# Patient Record
Sex: FEMALE | Race: WHITE | NOT HISPANIC OR LATINO | Employment: UNEMPLOYED | URBAN - METROPOLITAN AREA
[De-identification: names, ages, dates, MRNs, and addresses within clinical notes are randomized per-mention and may not be internally consistent; named-entity substitution may affect disease eponyms.]

---

## 2017-01-11 ENCOUNTER — HISTORICAL (OUTPATIENT)
Dept: RADIOLOGY | Facility: HOSPITAL | Age: 52
End: 2017-01-11

## 2017-02-10 ENCOUNTER — HISTORICAL (OUTPATIENT)
Dept: RADIOLOGY | Facility: HOSPITAL | Age: 52
End: 2017-02-10

## 2017-04-12 ENCOUNTER — HISTORICAL (OUTPATIENT)
Dept: RADIOLOGY | Facility: HOSPITAL | Age: 52
End: 2017-04-12

## 2017-05-08 ENCOUNTER — HISTORICAL (OUTPATIENT)
Dept: RADIOLOGY | Facility: HOSPITAL | Age: 52
End: 2017-05-08

## 2017-05-22 ENCOUNTER — HISTORICAL (OUTPATIENT)
Dept: ADMINISTRATIVE | Facility: HOSPITAL | Age: 52
End: 2017-05-22

## 2017-06-19 ENCOUNTER — HISTORICAL (OUTPATIENT)
Dept: ADMINISTRATIVE | Facility: HOSPITAL | Age: 52
End: 2017-06-19

## 2017-07-10 ENCOUNTER — HISTORICAL (OUTPATIENT)
Dept: SURGERY | Facility: HOSPITAL | Age: 52
End: 2017-07-10

## 2017-07-10 LAB
ABS NEUT (OLG): 4.4 X10(3)/MCL (ref 1.5–6.9)
ALBUMIN SERPL-MCNC: 3.3 GM/DL (ref 3.4–5)
ALBUMIN/GLOB SERPL: 0.8 RATIO
ALP SERPL-CCNC: 88 UNIT/L (ref 30–113)
ALT SERPL-CCNC: 20 UNIT/L (ref 10–45)
APTT PPP: 24.5 SECOND(S) (ref 25–35)
AST SERPL-CCNC: 12 UNIT/L (ref 15–37)
BILIRUB SERPL-MCNC: 0.6 MG/DL (ref 0.1–0.9)
BILIRUBIN DIRECT+TOT PNL SERPL-MCNC: 0.3 MG/DL
BILIRUBIN DIRECT+TOT PNL SERPL-MCNC: 0.3 MG/DL (ref 0–0.3)
BUN SERPL-MCNC: 12 MG/DL (ref 10–20)
CALCIUM SERPL-MCNC: 9.4 MG/DL (ref 8–10.5)
CHLORIDE SERPL-SCNC: 102 MMOL/L (ref 100–108)
CO2 SERPL-SCNC: 28 MMOL/L (ref 21–35)
CREAT SERPL-MCNC: 0.85 MG/DL (ref 0.7–1.3)
ERYTHROCYTE [DISTWIDTH] IN BLOOD BY AUTOMATED COUNT: 13.5 % (ref 11.5–17)
GLOBULIN SER-MCNC: 3.9 GM/DL
GLUCOSE SERPL-MCNC: 117 MG/DL (ref 75–116)
HCT VFR BLD AUTO: 39.7 % (ref 36–48)
HGB BLD-MCNC: 13.4 GM/DL (ref 12–16)
INR PPP: 1 (ref 0–1.2)
MCH RBC QN AUTO: 30 PG (ref 27–34)
MCHC RBC AUTO-ENTMCNC: 34 GM/DL (ref 31–36)
MCV RBC AUTO: 87 FL (ref 80–99)
PLATELET # BLD AUTO: 261 X10(3)/MCL (ref 140–400)
PMV BLD AUTO: 11.1 FL (ref 6.8–10)
POTASSIUM SERPL-SCNC: 4.6 MMOL/L (ref 3.6–5.2)
PROT SERPL-MCNC: 7.2 GM/DL (ref 6.4–8.2)
PROTHROMBIN TIME: 10.3 SECOND(S) (ref 9–12)
RBC # BLD AUTO: 4.55 X10(6)/MCL (ref 4.2–5.4)
SODIUM SERPL-SCNC: 139 MMOL/L (ref 135–145)
WBC # SPEC AUTO: 6.9 X10(3)/MCL (ref 4.5–11.5)

## 2017-07-13 ENCOUNTER — HISTORICAL (OUTPATIENT)
Dept: ANESTHESIOLOGY | Facility: HOSPITAL | Age: 52
End: 2017-07-13

## 2017-07-26 ENCOUNTER — HISTORICAL (OUTPATIENT)
Dept: ADMINISTRATIVE | Facility: HOSPITAL | Age: 52
End: 2017-07-26

## 2017-08-02 ENCOUNTER — HISTORICAL (OUTPATIENT)
Dept: ADMINISTRATIVE | Facility: HOSPITAL | Age: 52
End: 2017-08-02

## 2017-08-02 LAB
ALBUMIN SERPL-MCNC: 3.4 GM/DL (ref 3.4–5)
ALBUMIN/GLOB SERPL: 1 RATIO (ref 1–2)
ALP SERPL-CCNC: 68 UNIT/L (ref 45–117)
ALT SERPL-CCNC: 22 UNIT/L (ref 12–78)
AST SERPL-CCNC: 21 UNIT/L (ref 15–37)
BILIRUB SERPL-MCNC: 0.4 MG/DL (ref 0.2–1)
BILIRUBIN DIRECT+TOT PNL SERPL-MCNC: 0.1 MG/DL
BILIRUBIN DIRECT+TOT PNL SERPL-MCNC: 0.3 MG/DL
BUN SERPL-MCNC: 13 MG/DL (ref 7–18)
CALCIUM SERPL-MCNC: 9.4 MG/DL (ref 8.5–10.1)
CHLORIDE SERPL-SCNC: 102 MMOL/L (ref 98–107)
CO2 SERPL-SCNC: 31 MMOL/L (ref 21–32)
CREAT SERPL-MCNC: 0.8 MG/DL (ref 0.6–1.3)
ERYTHROCYTE [DISTWIDTH] IN BLOOD BY AUTOMATED COUNT: 13 % (ref 11.5–14.5)
EST. AVERAGE GLUCOSE BLD GHB EST-MCNC: 160 MG/DL
GLOBULIN SER-MCNC: 3.5 GM/ML (ref 2.3–3.5)
GLUCOSE SERPL-MCNC: 170 MG/DL (ref 74–106)
HBA1C MFR BLD: 7.2 % (ref 4.2–6.3)
HCT VFR BLD AUTO: 37.3 % (ref 35–46)
HGB BLD-MCNC: 12.7 GM/DL (ref 12–16)
MCH RBC QN AUTO: 29.2 PG (ref 26–34)
MCHC RBC AUTO-ENTMCNC: 34 GM/DL (ref 31–37)
MCV RBC AUTO: 85.7 FL (ref 80–100)
PLATELET # BLD AUTO: 230 X10(3)/MCL (ref 130–400)
PMV BLD AUTO: 10.9 FL (ref 7.4–10.4)
POTASSIUM SERPL-SCNC: 3.8 MMOL/L (ref 3.5–5.1)
PROT SERPL-MCNC: 6.9 GM/DL (ref 6.4–8.2)
RBC # BLD AUTO: 4.35 X10(6)/MCL (ref 4–5.2)
SODIUM SERPL-SCNC: 141 MMOL/L (ref 136–145)
WBC # SPEC AUTO: 5.9 X10(3)/MCL (ref 4.5–11)

## 2017-08-08 ENCOUNTER — HISTORICAL (OUTPATIENT)
Dept: SURGERY | Facility: HOSPITAL | Age: 52
End: 2017-08-08

## 2017-08-08 LAB — POTASSIUM SERPL-SCNC: 4 MMOL/L (ref 3.5–5.1)

## 2017-08-25 ENCOUNTER — HISTORICAL (OUTPATIENT)
Dept: ADMINISTRATIVE | Facility: HOSPITAL | Age: 52
End: 2017-08-25

## 2017-09-25 ENCOUNTER — HISTORICAL (OUTPATIENT)
Dept: ADMINISTRATIVE | Facility: HOSPITAL | Age: 52
End: 2017-09-25

## 2017-09-27 ENCOUNTER — HISTORICAL (OUTPATIENT)
Dept: ADMINISTRATIVE | Facility: HOSPITAL | Age: 52
End: 2017-09-27

## 2017-11-27 ENCOUNTER — HISTORICAL (OUTPATIENT)
Dept: ADMINISTRATIVE | Facility: HOSPITAL | Age: 52
End: 2017-11-27

## 2017-11-30 ENCOUNTER — HISTORICAL (OUTPATIENT)
Dept: RADIOLOGY | Facility: HOSPITAL | Age: 52
End: 2017-11-30

## 2018-03-09 ENCOUNTER — HISTORICAL (OUTPATIENT)
Dept: RADIOLOGY | Facility: HOSPITAL | Age: 53
End: 2018-03-09

## 2018-03-19 ENCOUNTER — HISTORICAL (OUTPATIENT)
Dept: RADIOLOGY | Facility: HOSPITAL | Age: 53
End: 2018-03-19

## 2018-07-02 ENCOUNTER — HISTORICAL (OUTPATIENT)
Dept: ADMINISTRATIVE | Facility: HOSPITAL | Age: 53
End: 2018-07-02

## 2018-08-24 ENCOUNTER — HISTORICAL (OUTPATIENT)
Dept: SURGERY | Facility: HOSPITAL | Age: 53
End: 2018-08-24

## 2018-08-24 LAB
ABS NEUT (OLG): 2.9 X10(3)/MCL (ref 1.5–6.9)
ALBUMIN SERPL-MCNC: 3.1 GM/DL (ref 3.4–5)
ALBUMIN/GLOB SERPL: 0.9 RATIO
ALP SERPL-CCNC: 73 UNIT/L (ref 30–113)
ALT SERPL-CCNC: 17 UNIT/L (ref 10–45)
APTT PPP: 24.2 SECOND(S) (ref 25–35)
AST SERPL-CCNC: 12 UNIT/L (ref 15–37)
BASOPHILS # BLD AUTO: 0 X10(3)/MCL (ref 0–0.1)
BASOPHILS NFR BLD AUTO: 0 % (ref 0–1)
BILIRUB SERPL-MCNC: 0.6 MG/DL (ref 0.1–0.9)
BILIRUBIN DIRECT+TOT PNL SERPL-MCNC: 0.2 MG/DL (ref 0–0.3)
BILIRUBIN DIRECT+TOT PNL SERPL-MCNC: 0.4 MG/DL
BUN SERPL-MCNC: 12 MG/DL (ref 10–20)
CALCIUM SERPL-MCNC: 9.2 MG/DL (ref 8–10.5)
CHLORIDE SERPL-SCNC: 106 MMOL/L (ref 100–108)
CO2 SERPL-SCNC: 33 MMOL/L (ref 21–35)
CREAT SERPL-MCNC: 0.88 MG/DL (ref 0.7–1.3)
EOSINOPHIL # BLD AUTO: 0 X10(3)/MCL (ref 0–0.6)
EOSINOPHIL NFR BLD AUTO: 0 % (ref 0–5)
ERYTHROCYTE [DISTWIDTH] IN BLOOD BY AUTOMATED COUNT: 13.6 % (ref 11.5–17)
GLOBULIN SER-MCNC: 3.6 GM/DL
GLUCOSE SERPL-MCNC: 94 MG/DL (ref 75–116)
HCT VFR BLD AUTO: 37.1 % (ref 36–48)
HGB BLD-MCNC: 12.1 GM/DL (ref 12–16)
INR PPP: 0.9 (ref 0–1.2)
LYMPHOCYTES # BLD AUTO: 2.1 X10(3)/MCL (ref 0.5–4.1)
LYMPHOCYTES NFR BLD AUTO: 38.8 % (ref 15–40)
MCH RBC QN AUTO: 28 PG (ref 27–34)
MCHC RBC AUTO-ENTMCNC: 33 GM/DL (ref 31–36)
MCV RBC AUTO: 87 FL (ref 80–99)
MONOCYTES # BLD AUTO: 0.4 X10(3)/MCL (ref 0–1.1)
MONOCYTES NFR BLD AUTO: 7 % (ref 4–12)
NEUTROPHILS # BLD AUTO: 2.9 X10(3)/MCL (ref 1.5–6.9)
NEUTROPHILS NFR BLD AUTO: 54 % (ref 43–75)
PLATELET # BLD AUTO: 230 X10(3)/MCL (ref 140–400)
PMV BLD AUTO: 10.8 FL (ref 6.8–10)
POTASSIUM SERPL-SCNC: 4.3 MMOL/L (ref 3.6–5.2)
PROT SERPL-MCNC: 6.7 GM/DL (ref 6.4–8.2)
PROTHROMBIN TIME: 9.9 SECOND(S) (ref 9–12)
RBC # BLD AUTO: 4.26 X10(6)/MCL (ref 4.2–5.4)
SODIUM SERPL-SCNC: 140 MMOL/L (ref 135–145)
WBC # SPEC AUTO: 5.4 X10(3)/MCL (ref 4.5–11.5)

## 2018-08-27 ENCOUNTER — HISTORICAL (OUTPATIENT)
Dept: ANESTHESIOLOGY | Facility: HOSPITAL | Age: 53
End: 2018-08-27

## 2019-01-16 ENCOUNTER — HISTORICAL (OUTPATIENT)
Dept: ADMINISTRATIVE | Facility: HOSPITAL | Age: 54
End: 2019-01-16

## 2019-04-24 ENCOUNTER — HISTORICAL (OUTPATIENT)
Dept: RADIOLOGY | Facility: HOSPITAL | Age: 54
End: 2019-04-24

## 2019-07-24 ENCOUNTER — HISTORICAL (OUTPATIENT)
Dept: RADIOLOGY | Facility: HOSPITAL | Age: 54
End: 2019-07-24

## 2019-07-30 ENCOUNTER — HISTORICAL (OUTPATIENT)
Dept: RADIOLOGY | Facility: HOSPITAL | Age: 54
End: 2019-07-30

## 2019-11-14 ENCOUNTER — HISTORICAL (OUTPATIENT)
Dept: ADMINISTRATIVE | Facility: HOSPITAL | Age: 54
End: 2019-11-14

## 2020-01-13 ENCOUNTER — HISTORICAL (OUTPATIENT)
Dept: RADIOLOGY | Facility: HOSPITAL | Age: 55
End: 2020-01-13

## 2020-02-18 ENCOUNTER — HISTORICAL (OUTPATIENT)
Dept: ADMINISTRATIVE | Facility: HOSPITAL | Age: 55
End: 2020-02-18

## 2020-09-10 ENCOUNTER — HISTORICAL (OUTPATIENT)
Dept: ADMINISTRATIVE | Facility: HOSPITAL | Age: 55
End: 2020-09-10

## 2020-09-11 ENCOUNTER — HISTORICAL (OUTPATIENT)
Dept: RADIOLOGY | Facility: HOSPITAL | Age: 55
End: 2020-09-11

## 2020-09-23 ENCOUNTER — HISTORICAL (OUTPATIENT)
Dept: RADIOLOGY | Facility: HOSPITAL | Age: 55
End: 2020-09-23

## 2020-11-05 ENCOUNTER — HISTORICAL (OUTPATIENT)
Dept: RADIOLOGY | Facility: HOSPITAL | Age: 55
End: 2020-11-05

## 2020-11-19 ENCOUNTER — HISTORICAL (OUTPATIENT)
Dept: RADIOLOGY | Facility: HOSPITAL | Age: 55
End: 2020-11-19

## 2021-01-05 ENCOUNTER — HISTORICAL (OUTPATIENT)
Dept: RADIOLOGY | Facility: HOSPITAL | Age: 56
End: 2021-01-05

## 2021-01-21 ENCOUNTER — HISTORICAL (OUTPATIENT)
Dept: ADMINISTRATIVE | Facility: HOSPITAL | Age: 56
End: 2021-01-21

## 2021-02-12 ENCOUNTER — HISTORICAL (OUTPATIENT)
Dept: RADIOLOGY | Facility: HOSPITAL | Age: 56
End: 2021-02-12

## 2021-06-29 ENCOUNTER — HISTORICAL (OUTPATIENT)
Dept: ADMINISTRATIVE | Facility: HOSPITAL | Age: 56
End: 2021-06-29

## 2021-06-29 LAB — CANCER AG125 SERPL-ACNC: 8.75 U/ML (ref 0–38.1)

## 2021-08-11 ENCOUNTER — HISTORICAL (OUTPATIENT)
Dept: RADIOLOGY | Facility: HOSPITAL | Age: 56
End: 2021-08-11

## 2021-09-03 ENCOUNTER — HISTORICAL (OUTPATIENT)
Dept: RADIOLOGY | Facility: HOSPITAL | Age: 56
End: 2021-09-03

## 2021-09-20 ENCOUNTER — HISTORICAL (OUTPATIENT)
Dept: ADMINISTRATIVE | Facility: HOSPITAL | Age: 56
End: 2021-09-20

## 2022-02-02 LAB — CANCER AG125 SERPL-ACNC: 9.32 (ref 0–38.1)

## 2022-02-22 ENCOUNTER — HISTORICAL (OUTPATIENT)
Dept: ADMINISTRATIVE | Facility: HOSPITAL | Age: 57
End: 2022-02-22

## 2022-04-10 ENCOUNTER — HISTORICAL (OUTPATIENT)
Dept: ADMINISTRATIVE | Facility: HOSPITAL | Age: 57
End: 2022-04-10

## 2022-04-26 VITALS
BODY MASS INDEX: 55.32 KG/M2 | DIASTOLIC BLOOD PRESSURE: 62 MMHG | HEIGHT: 61 IN | SYSTOLIC BLOOD PRESSURE: 124 MMHG | WEIGHT: 293 LBS

## 2022-04-30 NOTE — OP NOTE
PROCEDURE:  Colonoscopy.    PREOPERATIVE DIAGNOSIS:  History of colon polyps.    POSTOPERATIVE DIAGNOSIS:  Normal colon.  No signs of recurrence.    INDICATIONS:  This is a 52-year-old white female patient who had a colonoscopy performed by Dr. Amos Sandoval, at which point she had some colon polyps.  I did not have any access to her previous documentation due to they were archived.  She was consented for the procedure in my office to follow up on these polyps.  The risks and benefits of the procedure were explained to her in detail.  She is willing to undergo those risks.    PROCEDURE IN DETAIL:  The patient was brought down to the endoscopy room suite, laid in left lateral decubitus position.  Rectal exam was performed.  It was within normal limits.  No internal, no external abnormalities.  Good rectal tone.     The Olympus colonoscope was advanced all the way to the cecum.  Prep was suboptimal.  Otherwise, there were no masses, no polyps, no mucosal changes to the cecum.  The same applies to the ascending colon, hepatic flexure, transverse colon, splenic flexure, descending colon, and sigmoid.  There was no diverticular disease as well.  In the retroflexion of the rectum, there were no abnormalities as well.    SUMMARY:  A 7o-year-old white female with previous history of colonic polyps with no signs of recurrence.      RECOMMENDATIONS:  At this point, repeat colonoscopy in 10 years or sooner if clinically indicated.        PARMJIT/JULIA   DD: 07/13/2017 1204   DT: 07/13/2017 1231  Job # [id number]    cc: PACO Frances III, M.D.

## 2022-04-30 NOTE — PROGRESS NOTES
"   Patient:   Natalia Gomes             MRN: [id number]            FIN: [id number]               Age:   52 years     Sex:  Female     :  1965   Associated Diagnoses:   Primary osteoarthritis of both knees; Obesity   Author:   Wilder Armando NP      Chief Complaint   2017 12:39 CDT      Here with Bilat. knee pain. Here for Knee injections        History of Present Illness   51 yo  female presents to ortho clinic for c/o bilateral knee pain.  Reports that she has chronic pain to bilateral knees due to OA that started "a few years ago" and has been getting progressively worse.  Denies any known injury or trauma.  Reports that standing or walking for long periods of time makes the pain worse.  Reports that she is currently healing from a foot fracture that she had surgery on.  Admits receiving knee injections in the past with good relief and requests repeat injections today.  Denies any numbness/tingling to bilateral LE.  Admits taking prescribed medication as needed for pain with moderate relief.  No other complaints today.      Review of Systems   ROS reviewed as documented in chart      Health Status   Allergies:    Allergic Reactions (Selected)  No Known Allergies,    Allergies (1) Active Reaction  No Known Allergies None Documented     Current medications:  (Selected)   Outpatient Medications  Ordered  Kenalog-40 injectable suspension: 40 mg, form: Injection, Intra-Articular, Once, first dose 17 13:00:00 CDT, stop date 17 13:00:00 CDT, 24  Kenalog-40 injectable suspension: 40 mg, form: Injection, Intra-Articular, Once, first dose 17 14:00:00 CDT, stop date 17 14:00:00 CDT, 24  Lidocaine 1% 10ml inj: 5 mL, form: Soln, Intra-Articular, Once, first dose 17 13:00:00 CDT, stop date 17 13:00:00 CDT  Lidocaine 1% 10ml inj: 5 mL, form: Soln, Intra-Articular, Once, first dose 17 13:01:00 CDT, stop date 17 13:01:00 CDT  Versed: 2 mg, form: " Injection, IV Push, Once, first dose 08/08/17 11:00:00 CDT, stop date 08/08/17 11:00:00 CDT, 1,023,851  Documented Medications  Documented  Actos 45 mg oral tablet: 45 mg = 1 tab(s), Oral, Daily, # 30 tab(s), 0 Refill(s)  Crestor 20 mg oral tablet: 20 mg = 1 tab(s), Oral, At Bedtime, # 30 tab(s), 0 Refill(s)  DULOXETINE 60MG CAP: 60 mg = 1 cap(s), Oral, Daily  Gemfibrozil 600 Mg Tablet: 600 mg = 1 tab(s), Oral, BID  Lyrica 75 mg oral capsule: 75 mg = 1 cap(s), Oral, TID, 0 Refill(s)  Polyeth Glyc 3350 Nf Pow:   Premarin 0.45 mg oral tablet: 0.45 mg = 1 tab(s), Oral, Daily, # 30 tab(s), 0 Refill(s)  Tramadol Hcl 50mg Tab: 50 mg = 1 tab(s), Oral, TID  buPROPion 150 mg/24 hours (XL) oral tablet, extended release: 300 mg =, Oral, q24hr, # 30 tab(s), 0 Refill(s)  glimepiride: 4 mg, Oral, BID, 0 Refill(s)  hydrochlorothiazide-valsartan 12.5 mg-80 mg oral tablet: 1 tab(s), Oral, Daily, # 30 tab(s), 0 Refill(s)  lamoTRIgine 100 mg oral tablet: 100 mg = 1 tab(s), Oral, BID, # 180 tab(s), 0 Refill(s)  metformin 500 mg oral tablet: 500 mg = 1 tab(s), Oral, BID, # 60 tab(s), 0 Refill(s)  omeprazole 40 mg oral DR capsule: 40 mg = 1 cap(s), Oral, Daily, # 30 cap(s), 0 Refill(s),    Home Medications (14) Active  Actos 45 mg oral tablet 45 mg = 1 tab(s), Oral, Daily  buPROPion 150 mg/24 hours (XL) oral tablet, extended release 300 mg, Oral, q24hr  Crestor 20 mg oral tablet 20 mg = 1 tab(s), Oral, At Bedtime  DULOXETINE 60MG CAP 60 mg = 1 cap(s), Oral, Daily  Gemfibrozil 600 Mg Tablet 600 mg = 1 tab(s), Oral, BID  glimepiride 4 mg, Oral, BID  hydrochlorothiazide-valsartan 12.5 mg-80 mg oral tablet 1 tab(s), Oral, Daily  lamoTRIgine 100 mg oral tablet 100 mg = 1 tab(s), Oral, BID  Lyrica 75 mg oral capsule 75 mg = 1 cap(s), Oral, TID  metformin 500 mg oral tablet 500 mg = 1 tab(s), Oral, BID  omeprazole 40 mg oral DR capsule 40 mg = 1 cap(s), Oral, Daily  Polyeth Glyc 3350 Nf Pow   Premarin 0.45 mg oral tablet 0.45 mg = 1 tab(s),  Oral, Daily  Tramadol Hcl 50mg Tab 50 mg = 1 tab(s), Oral, TID  ,    Medications (4) Active  Scheduled: (4)  lidocaine 1% MDV- 10 mL (per 10mg)  5 mL, Intra-Articular, Once  lidocaine 1% MDV- 10 mL (per 10mg)  5 mL, Intra-Articular, Once  triamcinolone acetonide 40 mg/ml Inj  40 mg 1 mL, Intra-Articular, Once  triamcinolone acetonide 40 mg/ml Inj  40 mg 1 mL, Intra-Articular, Once  Continuous: (0)  PRN: (0)     Problem list:    All Problems  Arthritis / SNOMED CT [id number] / Confirmed  Depression / SNOMED CT 578212580 / Confirmed  Diabetes / SNOMED CT [id number] / Confirmed  GERD (gastroesophageal reflux disease) / SNOMED CT [id number] / Confirmed  HTN (hypertension) / SNOMED CT [id number] / Confirmed  Hyperlipidaemia / SNOMED CT 425299539 / Confirmed  Metatarsal bone fracture / SNOMED CT 869854847 / Confirmed,    Active Problems (7)  Arthritis   Depression   Diabetes   GERD (gastroesophageal reflux disease)   HTN (hypertension)   Hyperlipidaemia   Metatarsal bone fracture         Histories   Past Medical History:    Active  Diabetes ([id number])  HTN (hypertension) ([id number])  Hyperlipidaemia (275784191)  Depression (405747530)  GERD (gastroesophageal reflux disease) (14XMO0Y9-37N2-0567-XZ6M-[id number])   Family History:    Metastatic cancer  Brother  Acute myocardial infarction.  Father     Procedure history:    ORIF Metatarsal (Right) on 8/8/2017 at 52 Years.  Comments:  8/10/2017 10:54 - Sofya AGUDELO , Yumiko MARCUS  auto-populated from documented surgical case  Bone Graft (None) on 8/8/2017 at 52 Years.  Comments:  8/10/2017 10:54 - Sofya AGUDELO , Yumiko MARCUS  auto-populated from documented surgical case  Colonoscopy (None) on 7/13/2017 at 52 Years.  Comments:  7/13/2017 12:05 - Shelbie AGUDELO, Mirna  auto-populated from documented surgical case  Cholecystectomy Laparoscopic  (None) on 7/6/2016 at 51 Years.  Comments:  7/6/2016 08:27 - Jannet Torres RN  auto-populated from documented surgical case  Hemorrhoidectomy (90437723).  Bladder neck operations (096046204).  Hysterectomy (317666908).  Colonoscopy, flexible; diagnostic, including collection of specimen(s) by brushing or washing, when performed (separate procedure) (94719).   Social History        Social & Psychosocial Habits    Alcohol  07/05/2016  Use: Never    Employment/School  07/10/2017  Status: Unemployed    Home/Environment  07/10/2017  Lives with: Children    Living situation: Home with assistance    Substance Abuse  07/05/2016  Use: Never    Tobacco  06/14/2016  Use: Former smoker    Number of years: 20  .        Physical Examination   Measurements from flowsheet : Measurements   9/27/2017 12:39 CDT      Weight Dosing             127.2 kg                             Weight Measured           127.2 kg                             Weight Measured and Calculated in Lbs     280.43 lb                             Height/Length Dosing      154.94 cm                             Height/Length Measured    154.94 cm                             BSA Measured              2.34 m2                             Body Mass Index Measured  52.99 kg/m2     General:  Alert and oriented.    HENT:  Normocephalic.    Neck:  Supple.    Integumentary:  Warm, Dry, Pink.    Neurologic:  No focal deficits.    Cognition and Speech:  Speech clear and coherent.    Psychiatric:  Appropriate mood & affect.    Bilateral knee exam:  Good ROM with pain/crepitis associated with movement.  TTP to medial and lateral joint lines.  NV intact.  No effusion noted.  +2 pedal pulse.  Extension = 5.  Flexion = 120.  Quad deconditioniong R > L.         Health Maintenance      Health Maintenance     Pending (in the next year)        Due            Alcohol Misuse Screening due  09/27/17  and every 1  year(s)           Cervical Cancer Screening due  09/27/17  and every 5   year(s)           Lipid Screening due  09/27/17  Variable frequency           Tetanus Vaccine due  09/27/17  and every 10  year(s)        Due In Future            Influenza Vaccine not due until  10/02/17  and every 1  year(s)           Blood Pressure Screening not due until  08/08/18  and every 1  year(s)           Depression Screening not due until  08/25/18  and every 1  year(s)     Satisfied (in the past 1 year)        Satisfied            Blood Pressure Screening on  08/08/17.  Satisfied by Tadeo Landeros PT           Body Mass Index Check on  09/27/17.  Satisfied by Jeane Gustafson LPN           Breast Cancer Screening on  02/10/17.  Satisfied by Madison Saeed           Colorectal Screening on  07/13/17.           Depression Screening on  08/25/17.  Satisfied by Morales Yu           Diabetes Screening on  08/02/17.  Satisfied by Sarah Blake           Obesity Screening on  09/27/17.  Satisfied by Jeane Gustafson LPN          Procedure   Joint aspiration/ injection procedure   Date/ Time:  9/27/2017 13:27:00.     Confirmed: patient, procedure, side, site, safety procedures followed.     Performed by: self.     Informed consent: signed by patient.     Indication: symptomatic relief.     Location: the left knee, the right knee.     Preparation and technique: skin prep alcohol/iodophor (Dura Prep), sterile preparation of site in usual fashion.     Joint injected: with  Lidocaine 1% 5mL and Kenalog 40mg 1mL.     Procedure tolerated: well.        Review / Management   Results review:     No qualifying data available.      Bilateral knee x-rays (9/27/17):    Impression:  Degenerative changes with no acute abnormalities noted.      Impression and Plan   Diagnosis     Primary osteoarthritis of both knees (DYW11-US M17.0).     Obesity (OVP06-LW E66.9).       Plan:  1.  Bilateral knee injections.  2.  ROM/strengthening exercises to bilateral LE (printed instructions given).  3.  Continue prescribed  medication as needed for pain relief.  4.  Ice compresses and activity modifications as needed for symptom relief.  5.  Diet modifications and low impact exercises to help with weight loss.  6.  RTC PRN.      Patient Instructions:  Knee Injection, Care After, Obesity, Easy-to-Read, Osteoarthritis.

## 2022-04-30 NOTE — OP NOTE
PREOPERATIVE DIAGNOSIS:  Large lipoma of the upper back.    POSTOPERATIVE DIAGNOSIS:  Large lipoma of the upper back.    OPERATION PERFORMED:  Excision of large lipoma of the upper back and layered closure of the wound.    ANESTHESIA:  General.    PROCEDURE IN DETAIL:  This 53-year-old female patient was brought to the operating room, placed in supine position.  General endotracheal anesthesia was given.  Subsequently was placed in prone position.  The neck was kept in flexion.  The upper back was prepared and draped in the usual fashion.  This mass was just inferior to the neck.  By a long transverse incision, skin and subcutaneous tissue were incised.  Flaps were raised to either side.  The large lipoma was identified.  It measured 15 x 8 cm x 5.5 cm in size.  During the excision, I even ended up removing some deep fascia.  Hemostasis was obtained with diathermy.  The deep fascia reapproximated with 0 Vicryl interrupted sutures.  Part of the subcutaneous plane approximated with 0 Vicryl interrupted sutures.  A Steven-Dominguez drain was introduced in the depth of the wound and brought out through a separate stab wound on the right side of the upper back.  Skin approximated with skin clips.  Drain fixed to the skin with 2-0 silk suture.  Dressing applied.  She tolerated the procedure well.  She received 1 g Ancef prior to the procedure.  Prior to layered closure, wound measured 14 cm.  She was transferred to recovery room in good condition.        AMY/JULIA   DD: 08/27/2018 2133   DT: 08/27/2018 2237  Job # [id number]    cc: PACO Perez M.D.

## 2022-04-30 NOTE — H&P
Patient:   Natalia Gomes             MRN: [id number]            FIN: [id number]               Age:   52 years     Sex:  Female     :  1965   Associated Diagnoses:   None   Author:   Angelina CALLEJAS, Prosper ROSADO have reviewed the H&P below, there are no changes.    Chief Complaint F/U Rt Foot Fx   History of Present Illness 52F with hx of R caroline fx sustained 2017 that was treated non operatively in cast and then hard sole shoe. She was last seen last month and made PRN as the fx had shown signs of healing. She returns today for re-eval due to continued pain and swelling   Review of Systems Constitutional no fevers, chills  Eye neg  ENMT neg  Respiratory no shortness of breath  Cardiovascular no chest pain  Gastrointestinal no abdominal pain  Genitourinary neg  Hema/Lymph no easy bruising  Endocrine neg  Immunologic neg  Musculoskeletal as above  Integumentary as above  Neurologic as above  All Other ROS negative except as stated above   Problem List/Past Medical History Arthritis Depression Diabetes GERD (gastroesophageal reflux disease) HTN (hypertension) Hyperlipidaemia Historical   No historical problems Procedure/Surgical History Colonoscopy (None) (2017), COLORECTAL CANCER SCREENING; COLONOSCOPY ON INDIVIDUAL AT HIGH RISK (2017), Inspection of Lower Intestinal Tract, Via Natural or Artificial Opening Endoscopic (2017), Cholecystectomy Laparoscopic (None) (2016), Laparoscopy, surgical; cholecystectomy (2016), Resection of Gallbladder, Percutaneous Endoscopic Approach (2016), Bladder neck operations, Colonoscopy, flexible; diagnostic, including collection of specimen(s) by brushing or washing, when performed (separate procedure), Hemorrhoidectomy, Hysterectomy. Medications Actos 45 mg oral tablet, 45 mg, 1 tab(s), Oral, Daily buPROPion 150 mg/24 hours (XL) oral tablet, extended release, 300 mg, Oral, q24hr Crestor 20 mg oral tablet, 20 mg, 1 tab(s), Oral,  At Bedtime DULOXETINE 60MG CAP, 60 mg, 1 cap(s), Oral, Daily glimepiride, 4 mg, Oral, BID Glimepiride 4mg Tab, 4 mg, 1 tab(s), Oral, BID hydrochlorothiazide-valsartan 12.5 mg-80 mg oral tablet, 1 tab(s), Oral, Daily lamoTRIgine 100 mg oral tablet, 100 mg, 1 tab(s), Oral, BID Lyrica 75 mg oral capsule, 75 mg, 1 cap(s), Oral, TID metformin 500 mg oral tablet, 500 mg, 1 tab(s), Oral, BID omeprazole 40 mg oral DR capsule, 40 mg, 1 cap(s), Oral, Daily Polyeth Glyc 3350 Nf Pow Premarin 0.45 mg oral tablet, 0.45 mg, 1 tab(s), Oral, Daily Smz/Tmp Ds 800-160 Tab, 1 tab(s), Oral, BID, Not taking Tramadol Hcl 50mg Tab, 50 mg, 1 tab(s), Oral, TID Trilipix 135 mg oral delayed release capsule, 135 mg, 1 cap(s), Oral, At Bedtime Allergies No Known Allergies Social History   Alcohol   Current   Never   Employment/School   Unemployed   Home/Environment   Lives with Children. Living situation: Home with assistance.   Substance Abuse   Never   Tobacco   Former smoker, 20 year(s). Family History Acute myocardial infarction.: Father. Metastatic cancer: Brother. Diagnostic Results Xrays of the foot - reveal persistent fracture in watershed area    Physical Exam   Vitals & Measurements T: 36.6 °C (Oral) HR: 67 (Monitored) BP: 143/73 HT: 154.9 cm HT: 154.9 cm WT: 126.6 kg WT: 126.6 kg BMI: 52.76   Gen: NAD, A&Ox3  Cardiac: RRR by PP  Pulm: non-labored WOB  Abd: soft, nt, nd  Ext  No gross deformities  TTP over 5th MT base  Motor intact EHL/FHL/GS/TA  SILT SP/DP/Vasquez/SaT  2+ DP/PT   Assessment/Plan 1. Displaced fracture of fifth metatarsal bone, right foot, initial encounter for closed fracture - Risks and benefits to operative vs continued non-operative management were discussed and the patient would like to proceed with ORIF of R 5th MT. Her fracture is now 4 months out and is unlikely to heal with continued non-operative management. She understands that this may be difficult to get the bone to heal and may require bone grafting.   -  consented and booked for 8/8/17   Ordered:   Clinic Follow-up PRN, 07/26/17 12:12:00 CDT Hemoglobin A1C [facility], Routine collect, *Est. 08/02/17 3:00:00 CDT, Blood, Order for future visit, *Est. Stop date 08/02/17 3:00:00 CDT, Lab Collect, Displaced fracture of fifth metatarsal bone, right foot, initial encounter for closed fracture, 1, week(s), In Approximately...

## 2022-04-30 NOTE — H&P
CHIEF COMPLAINT:  Soft tissue mass of the back.    HISTORY OF PRESENT ILLNESS:  This 53-year-old female patient who was referred to me by Dr. Mikael Ray because of a large soft tissue mass noted on her back.  Patient has some discomfort from the same.  I evaluated and subsequently suggested surgical excision of the same.     Patient has multiple medical problems including hypertensive cardiovascular disease. diabetes mellitus, hyperlipidemia, gastroesophageal reflux disease, depression, arthritis.     Patient is on multiple medications at home including metformin, Actos, losartan with hydrochlorothiazide, glimepiride, gemfibrozil, gabapentin, Lasix, Premarin, lamotrigine, naproxen, omeprazole, Crestor, tramadol, Trintellix, etc.    PAST SURGICAL HISTORY:  Patient had multiple surgical procedures in the past including hysterectomy, bladder suspension, hemorrhoidectomy, polypectomy from the rectum and from the colon, cholecystectomy.    SOCIAL HISTORY:  She used to be a smoker, has stopped smoking some time back.  No history of alcohol abuse.  No history of any illicit drug use.    FAMILY HISTORY:  Father had hypertension and myocardial infarction, and also had diabetes mellitus.    REVIEW OF SYSTEMS:  RESPIRATORY:  No shortness of breath.   CARDIOVASCULAR:  Known hypertensive.   NEUROLOGIC:  No seizures.   GASTROINTESTINAL:  Patient has vague abdominal pain.   ENDOCRINE:  Diabetes mellitus.   GENITOURINARY:  Stress incontinence.     MUSCULOSKELETAL:  Arthritis of joints.   HEMATOLOGIC:  No bleeding tendencies.   PSYCHIATRIC:  She has depression.     GENERAL:  Patient has a soft tissue mass on the back.     OTHER SYSTEMS:  No complaints.    PHYSICAL EXAMINATION:  GENERAL:  Condition of the patient is satisfactory.     HEENT:  No scalp lesion.  Oral cavity and throat normal.     NECK:  Supple.  No cervical or supraclavicular nodes enlarged.  Thyroid:  No nodules.  Trachea central.  No carotid bruits.  Jugular  venous pressure normal.   CHEST:  Air entry equal on both sides.  No rales or wheezing.     HEART:  Regular.  No murmur.  No gallop.     BREASTS:  No lumps palpable.   AXILLARY:  Lymph  nodes enlarged.     BACK:  Patient has a large soft tissue mass situated on the upper back.  Firm in consistency.  No definite tenderness elicited.  Spine is normal.  Thoracic spine:  No tenderness noted.     ABDOMEN:  Soft.  Liver and spleen not enlarged.  No masses palpable.  No area of tenderness.  Good bowel sounds.  Hernia sites normal.     GENITALIA:  Normal.   EXTREMITIES:  Femoral, popliteal, and pedal pulses present.  No varicose veins.  No calf muscle tenderness.  Upper extremities, normal.     NEUROLOGIC STATUS:  Normal.    CLINICAL IMPRESSION:  Soft tissue mass, upper back.    PLAN:  The patient is scheduled for excision of the soft tissue mass from the upper back.        AMY/JULIA   DD: 08/25/2018 0932   DT: 08/25/2018 0957  Job # [id number]    cc: PACO Perez M.D.

## 2022-04-30 NOTE — OP NOTE
DATE OF SURGERY:    08/08/2017    SURGEON:  FUENTES BROWN MD    ATTENDING PHYSICIAN:  Warren Gunderson M.D.    PREOPERATIVE DIAGNOSIS:  Right 5th metatarsal zone 3 fracture.    POSTOPERATIVE DIAGNOSIS:  Right 5th metatarsal zone 3 fracture.    PROCEDURE PERFORMED:  Open reduction, internal fixation of right 5th metatarsal fracture with intramedullary screw.    ATTENDING PHYSICIAN:  Alirio Petersen MD    RESIDENT:    1. Fuentes Brown M.D..  2. Prosper Alfaro M.D.    IMPLANTS:  Arthrex 4.5 mm x 40 mm solid screw.    BLOOD LOSS:  Minimal.    COMPLICATIONS:  None.    FINDINGS:  Consistent with preoperative evaluation.    OPERATIVE INDICATIONS:  The patient is a 52-year-old female who sustained an inversion injury to her right foot in late March of 2017, approximately 4-1/2 months ago.  She was initially treated with nonweightbearing, immobilization for a significant portion of time.  Although there had been some initial improvements in her pain she continued to have significant pain when she began to ambulate.  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  We discussed risks, benefits, and alternatives to surgery and the patient was strongly in favor of surgical intervention to promote healing to stabilize the fracture.  She understood the nature of this fracture with the local blood supply is at a high risk for nonunion, malunion and persistent nonunions despite intramedullary fixation she understood these risks and understood the alternative of continue nonweightbearing.  However, she again was very strongly for surgery.  The patient had stopped smoking many years ago.  All other preoperative risk factors for nonunion were optimized with the plan to take a vitamin D lab and administer vitamin D postoperatively.    OPERATIVE DETAILS:  The patient was seen and evaluated in same-day surgery, found to be in good condition for surgery.  The right lower  extremity was marked.  The patient's H and P was updated.  Consents were checked.  The patient was taken the operative suite and administered general anesthesia.  The right lower extremity was prepped and draped in a normal sterile fashion.  A time-out was performed confirming correct patient, correct operative site as well as the administration of appropriate antibiotics and the appropriate consents.  Radiographs were taken of the right foot overlaid with a guidewire for the appropriate starting point and the appropriate incision.  An Esmarch was utilized and then secured above the level of the ankle for tourniquet and level control.  The incision was made in line with the planned starting point over the proximal aspect of the 5th metatarsal.  Dissection was carried down with care to protect the superficial branches of the sural nerve.  Dissection was carried down to the level of the proximal 5th metatarsal.  A soft tissue protector was placed and then a guide pin was placed in the appropriate location in a high and inside standard technique.  The guide pin was advanced across the fracture site and this was confirmed under fluoroscopy in all views, AP, oblique and lateral views to be within the intramedullary canal of the 5th metatarsal.  At this time the guide pin was measured and was found to be approximately consistent with a 40 mm screw.  We also placed the 40 mm screw over the 5th metatarsal externally to gauge the appropriate size and was found to be the appropriate size as this was the smallest small screw.  The starting was placed to the level of the fracture site to open the proximal aspect of the cortex as well as stimulate local healing.  The drill was then removed and then a 4.5 mm tap was placed along the full planned depth of the screw.  This was found to have very good bite and very good fit distally.  As such the decision was made to go with the previously determined length of 40 mm and a screw size  and diameter of 4.5 mm.  All guidewires and cannulas and soft tissue protectors were then removed.  A 4.5 mm x 40 mm Arthrex Stinson fracture screw was then placed across the fracture site with some compression.  Care was taken not to over compress the proximal aspect to cause any iatrogenic fracture.  Care was also taken not to over compress to avoid potential stress risers distally in the metatarsal.  There was found to be some compression of the fracture site with special attention being paid to the most plantar aspect of the fracture site on the tension side.  There was found to be good reduction.  There was overall good alignment of the 5th metatarsal.  The wound was then copiously irrigated.  Esmarch was released and the wound was closed in layered fashion.  Fluoroscopy confirmed appropriate reduction as well as appropriate placement of the screw in all 3 planes, AP, oblique and lateral.  Standard dressings were applied.  A posterior splint was applied.  Patient was taken to the PACU having suffered no acute issues.    POSTOPERATIVE PLAN:  Patient to be nonweightbearing to the right lower extremity until evidence of radiographic healing, likely at least 6-8 weeks, possibly up to 12 weeks.  We discussed this with the patient.  We will allow her to go into a Cam boot at the 2 week postoperative period if the patient is reasonably     compliant.  However, if there was a concern for compliance, we will patient place the patient in a short leg cast until week 6 postoperatively to allow for appropriate healing.        ______________________________  MD LUKE GLOVER/JULIA  DD:  08/08/2017  Time:  10:55AM  DT:  08/08/2017  Time:  11:51AM  Job #:  [id number]

## 2022-05-02 ENCOUNTER — HISTORICAL (OUTPATIENT)
Dept: ADMINISTRATIVE | Facility: HOSPITAL | Age: 57
End: 2022-05-02

## 2022-05-03 NOTE — HISTORICAL [facility] CERNER
This is a historical note converted from Lisbet. Formatting and pictures may have been removed.  Please reference Lisbet for original formatting and attached multimedia. Chief Complaint  f/u bilat knee pain ankle pain  History of Present Illness  52yo?LHD?female?non-smoker?presents hx of chronic b/l knee OA, HLD, HTN, DM, Depression, and Anxiety?to?Ortho Clinic?for?follow up ?visit?for b/l knee pain ~6 yrs, equal in pain.??Pt was last seen in [facility] July 2018 had CSI done at that time. states this provided no relief.  DOI: none  Occupation: currently unemployed, former grocery   BEBO: none  Previous treatment:?b/ knee CSI x 2: 9/2017, 2016 with 1-2months of relief.? Naproxen with minimal relief.? In process of doing water aerobics in [place].? no HEP.  Has been doing pool therapy and states that it works and she experiences almost no pain while in the pool.  Previous injuries:?denies  Fam Hx of Arthritis:?no  B/l Knee Current pain level: 8/10, sharp, achy, dull, worsened with use, and alleviated with rest.? Associated Symptoms:?no numbness or tingling;?no swelling;?no skin changes:?no weakness;?no decrease in ROM  Review of Systems  Constitutional: no fever, no chills, no weight loss  CV: no swelling, no edema  Res: No cough, wheezing, sob  GI: no fecal incontinence  : no urinary retention, no urinary incontinence  Skin: no rash, no wound  Neuro: no numbness/tingling, no weakness, no saddle anesthesia  MSK: as above  Psych: no depression, no anxiety  Heme/Lymph: no easy bruising, no easy bleeding, no lymphadenopathy  Immuno: no MRSA history  ?  Physical Exam  Vitals & Measurements  HT:?152.4?cm? HT:?152.4?cm? WT:?134?kg? WT:?134?kg? BMI:?57.69?  Inspection:?Normal?gait, ?full?weightbearing, ?normal?alignment, ?no?swelling, ?no?erythema,?no?bruising, ?no?atrophy  Palpation:?TTP medial and lateral jt line  ROM:?  Flexion (0-140):?110 degrees  Extension:?0?degrees  Crepitus:? Negative  Strength:? Flexion  ?5/5, Extension ?5/5  Neurovascular:? 2+?distal pulse bilaterally, sensation intact  ?   Special Tests:  Ballotable Effusion: ?Negative  Fluid Wave:?Negative  Patellar Compression: ?Negative  Medial Retinaculum:?tender  Lateral Retinaculum:??tender  Lachmans:?Negative  Posterior Drawer:?Negative  McMurrays:?Negative  Apleys Compression:?Negative  Varus Stress:? no laxity  Valgus Stress:? no laxity  ?   L Knee:  Inspection: ??Normal?gait, ?full?weightbearing, ?normal?alignment, ?no?swelling, ?no?erythema, ?no?bruising, ?no?atrophy  Palpation:? TTP at medial and lateral jt line  ROM:?  Flexion (0-140):?110 degrees  Extension:?0?degrees  Crepitus:? Negative  Strength:? Flexion ?5/5, Extension ?5/5  Neurovascular:? 2+?distal pulse bilaterally, sensation intact  ?   Special Tests:  Ballotable Effusion: ?Negative  Fluid Wave: ?Negative  Patellar Compression: ?Negative  Medial Retinaculum:??Tender  Lateral Retinaculum:??Positive  Lachmans:??Negative  Posterior Drawer: ?Negative  McMurrays:??Negative  Apleys Compression:??Negative  Varus Stress:??no laxity  Valgus Stress:??no laxity  ?   General: well developed;?well nourished; cooperative; obese; 2 daughters at bedside  PSYCH: alert and oriented x 3?with?appropriate mood and affect  SKIN: inspection and palpation of skin and soft tissue normal; no scars noted on upper/lower extremities  CV: vascular integrity noted; +2 symmetrical pulses, no edema  NEURO: sensation intact by light touch; DTRs +2 bilateral and symmetrical  LYMPH: no LAD noted  Assessment/Plan  OA (osteoarthritis) of knee?M17.10  ?DX: bilateral tricompartmental?OA most severe in medial compartments.  -discussed non surgical options as she needs to lose weight before becoming a surgical candidate. She will cont. pool PT, will see nutritionist and will discuss with her PCP at next meeting about bariatric surgery.  CSIs have essentially been exhausted and no longer provide relief. Will try hyalgan  intra-articular series.  Radiology:?radiographs ordered and independently reviewed by me; discussed with patient; pending radiologist interpretation  Stabilization/Immobilization:?None  Activity:?Activity as tolerated  Therapy:?formal PT/OT?and HEP  Medication:?OTC NSAIDs or Tylenol prn  RTC:?once hyalgan is approved by insurance. can be done in injection clinic.  ?  Ordered:  sodium hyaluronate, 20 mg, form: Soln, Intra-Articular, Once, first dose 01/30/19 16:00:00 CST, stop date 01/30/19 16:00:00 CST  sodium hyaluronate, 20 mg, form: Soln, Intra-Articular, Once, first dose 01/30/19 16:00:00 CST, stop date 01/30/19 16:00:00 CST  XR Knee Left 3 Views, Routine, 01/16/19 14:43:00 CST, Arthritis, None, Wheelchair, Patient Has IV?, Rad Type, OA (osteoarthritis) of knee, Not Scheduled, 01/16/19 14:43:00 CST  XR Knee Right 3 Views, Routine, 01/16/19 14:43:00 CST, Arthritis, arthritis f/u, None, Wheelchair, Patient Has IV?, Rad Type, OA (osteoarthritis) of knee, Not Scheduled, 01/16/19 14:43:00 CST  ?   Problem List/Past Medical History  Ongoing  Arthritis  Depression  Diabetes  GERD (gastroesophageal reflux disease)  HTN (hypertension)  Hyperlipidaemia  Metatarsal bone fracture  Historical  No qualifying data  Procedure/Surgical History  Excision Lipoma (Back, Upper) (08/27/2018)  Bone Graft (None) (08/08/2017)  ORIF Metatarsal (Right) (08/08/2017)  Colonoscopy (None) (07/13/2017)  Cholecystectomy Laparoscopic (None) (07/06/2016)  Bladder neck operations  Colonoscopy, flexible; diagnostic, including collection of specimen(s) by brushing or washing, when performed (separate procedure)  Hemorrhoidectomy  Hysterectomy   Medications  Actos 45 mg oral tablet, 45 mg= 1 tab(s), Oral, qAM  Amox Tr-K Clv 875-125 Mg Tab, 1 tab(s), Oral, BID,? ?Not taking  Azithromycin 500 Mg Tablet, 500 mg= 1 tab(s), Oral, Daily,? ?Not taking  CETIRIZINE TAB 10MG, 10 mg= 1 tab(s), Oral, Daily  Crestor 20 mg oral tablet, 20 mg= 1 tab(s), Oral, At  Bedtime  CYCLOBENZAPR TAB 10MG, Oral,? ?Not taking  DICLOFENAC GEL 1%, 1 gm= 1 gm, TOP, QID,? ?Not taking  FLUTICASONE SPR 50MCG  FUROSEMIDE TAB 40MG, 40 mg= 1 tab(s), Oral, qAM, PRN  GABAPENTIN TAB 800MG, 800 mg= 1 tab(s), Oral, TID  Gemfibrozil 600 Mg Tablet, 600 mg= 1 tab(s), Oral, BID  Glimepiride 4 Mg Tablet, 4 mg= 1 tab(s), Oral, qAM  Hyalgan 10 mg/mL intra-articular solution, 20 mg= 2 mL, Intra-Articular, Once  Hyalgan 10 mg/mL intra-articular solution, 20 mg= 2 mL, Intra-Articular, Once  hydrochlorothiazide-valsartan 12.5 mg-80 mg oral tablet, 1 tab(s), Oral, qAM  Ipratrop Bromid 0.06% Spr  KETOTIF FUM IGNACIO 0.025%OP,? ?Not taking  lamoTRIgine 100 mg oral tablet, 100 mg= 1 tab(s), Oral, BID  metformin 500 mg oral tablet, 500 mg= 1 tab(s), Oral, BID  NAPROXEN TAB 500MG, 500 mg= 1 tab(s), Oral, BID  omeprazole 40 mg oral DR capsule, 40 mg= 1 cap(s), Oral, qAM  Premarin 0.45 mg oral tablet, 0.45 mg= 1 tab(s), Oral, qAM  Tramadol Hcl 50mg Tab, 50 mg= 1 tab(s), Oral, TID  TRINTELLIX TAB 10MG, 10 mg= 1 tab(s), Oral, qAM  Ventolin Hfa Aer  Versed, 2 mg= 2 mL, IV Push, Once  Allergies  No Known Allergies  Social History  Alcohol  Never, 07/05/2016  Employment/School  Disabled, 08/24/2018  Home/Environment  Lives with Children. Living situation: Home with assistance., 07/10/2017  Sexual  Sexual orientation: Heterosexual., 08/24/2018  Substance Abuse  Never, 07/05/2016  Tobacco  Former smoker, quit more than 30 days ago, No, 01/16/2019  Family History  Acute myocardial infarction.: Father.  Diabetes mellitus type 2: Mother.  High blood pressure: Mother.  High cholesterol: Mother.  Metastatic cancer: Brother.  Health Maintenance  Health Maintenance  ???Pending?(in the next year)  ??? ??OverDue  ??? ? ? ?Diabetes Maintenance-HgbA1c due??08/02/18??and every 1??year(s)  ??? ??Due?  ??? ? ? ?ADL Screening due??01/16/19??and every 1??year(s)  ??? ? ? ?Alcohol Misuse Screening due??01/16/19??and every 1??year(s)  ??? ? ?  ?Diabetes Maintenance-Eye Exam due??01/16/19??and every?  ??? ? ? ?Diabetes Maintenance-Fasting Lipid Profile due??01/16/19??and every?  ??? ? ? ?Diabetes Maintenance-Foot Exam due??01/16/19??and every?  ??? ? ? ?Tetanus Vaccine due??01/16/19??and every 10??year(s)  ??? ??Due In Future?  ??? ? ? ?Blood Pressure Screening not due until??07/25/19??and every 1??year(s)  ??? ? ? ?Hypertension Management-Blood Pressure not due until??07/25/19??and every 1??year(s)  ??? ? ? ?Hypertension Management-BMP not due until??08/24/19??and every 1??year(s)  ???Satisfied?(in the past 1 year)  ??? ??Satisfied?  ??? ? ? ?Blood Pressure Screening on??08/27/18.??Satisfied by Fermin C.N.ATracy Kirkpatrick T  ??? ? ? ?Body Mass Index Check on??01/16/19.??Satisfied by Kaylin Siegel  ??? ? ? ?Breast Cancer Screening on??03/09/18.??Satisfied by Madison Saeed  ??? ? ? ?Diabetes Screening on??08/24/18.??Satisfied by Lacy Arora  ??? ? ? ?Hypertension Management-Blood Pressure on??08/27/18.??Satisfied by Fermin C.N.A.Tracy T  ??? ? ? ?Obesity Screening on??01/16/19.??Satisfied by Kaylin Siegel F  ?  ?      Discussed with the fellow at time of visit? Patient chart reviewed. Patient seen and evaluated at time of visit.?HPI, PE, and Assessment and Plan reviewed. Treatment plan is reasonable and appropriate.? All questions were answered.Compliance with treatment plan is appropriate.  Radiology images independently reviewed and agree with radiologist. ?Radiology images independently reviewed and agree with fellow.  ?  XR R Knee 1/16/19:  Tricomp OA, medial jt space narrowing.? No acute fracture or dislocation present.  ?  XR L Knee 1/16/19:  Tricomp OA, medial jt space narrowing.? No acute fracture or dislocation present.

## 2022-05-03 NOTE — HISTORICAL [facility] CERNER
This is a historical note converted from Lisbet. Formatting and pictures may have been removed.  Please reference Lisbet for original formatting and attached multimedia. Chief Complaint  Right thumb pain  History of Present Illness  56-year-old female, left-hand-dominant complaining of right?thumb pain. ?Is been persistent for?at least a few months now. ?Does not hurt all the time but with certain activities it will?cause a deep aching pain.? Has not tried injections. ?Unable to take anti-inflammatories given her other medical comorbidities which are quite extensive. ?Non-smoker. ?She has a brace?that fits well and relieves her symptoms when she wears it.  Review of Systems  Constitutional:?no fever, fatigue, weakness  Eye:?no vision loss, eye redness, drainage, or pain  ENMT:?no sore throat, ear pain, sinus pain/congestion, nasal congestion/drainage  Respiratory:?no cough, no wheezing, no shortness of breath  Cardiovascular:?no chest pain, no palpitations, no edema  Gastrointestinal:?no nausea, vomiting, or diarrhea. No abdominal pain  Physical Exam  Vitals & Measurements  HT:?154.00?cm? WT:?138.000?kg? BMI:?58.19?  No acute distress, alert and oriented  Regular rate on peripheral exam  No critical breathing  ?  Right upper extremity  No swelling to the hand  No skin lesions  Nontender to palpation?about the?SL joint?and rest of the hand  Tenderness to palpation at the base of the thumb  She has strong opposition of the thumb and full range of motion  Positive?grind test  Negative carpal compression test  Negative Finkelsteins  ?  X-ray imaging of the right hand reviewed significant for a widened SL joint with?minimal to no arthritic changes in the carpus,?there is basilar thumb arthritis present with?some mild subluxation and?beaking of the trapezium  Assessment/Plan  Pain in right hand?M79.641  ?Basilar thumb arthritis  Ordered:  Lidocaine inj., 1 mL, form: Injection, Intra-Articular, Once, first dose 09/20/21  14:24:00 CDT, stop date 09/20/21 14:24:00 CDT  triamcinolone, 40 mg, form: Injection, Intra-Articular, Once, first dose 09/20/21 15:00:00 CDT, stop date 09/20/21 15:00:00 CDT  Clinic Follow-up PRN, 09/20/21 14:24:00 CDT  ?  The patient is suffering from chronic right basilar thumb pain.? I offered the patient a steroid injection today and she?would like to proceed with this. ?She will wear the thumb brace?on an as-needed basis.? The patient wishes to avoid surgery and she will continue to manage her condition with nonoperative means for the time being. ?Follow-up as needed.  ?  Date/Time:?9/20/2021 14:27:46  Procedure:?Right?CMC joint injection  Indications:?Therapeutic Indication- decrease pain, increase range of motion and improve quality of life.  RISKS: Possible complications with injection include bleeding, infection (0.01%), tendon rupture, steroid flare, fat pad or soft tissue atrophy, skin depigmentation, and vasovagal response. ( steroid flare treatment is rest, ice, NSAIDS and resolves in 24-36 hrs.)  Consent: Procedure, risks, benefits, and alternatives explained to patient, who voiced understanding and agreed to proceed with procedure. Consent signed and scanned into the medical record. No absolute contraindications ( cellulitis overlying joint, infection, lack of informed consent, allergy to injection medication, stephanie protein or egg allergy for sodium hyaluronate, or history of steroid flare) or relative contraindications ( brittle or out of control DM HgA1c >10, coagulopathy INR > 3.5, previous joint replacement, or history of AVN.)  Staff:?Dr. Paulino Ramirez  Description: Time out performed. The patient was prepped using Chlorhexidine scrub after the appropriate?identification of anatomic landmarks.?Sterile needle used (?21 gauge, 1.5 inch)?Topical anesthetic of ethyl chloride was used?1 ml of 1% lidocaine plain with 40 mg of Kenalog injected.  Complications: none  EBL: none  Post procedure:?Patient  reports improvement in pain and ROM.Patient is alert, moving all extremities. Good peripheral pulses, no signs of vascular compromise, range of motion intact. Patient tolerated procedure well. Aftercare instructions were given to patient at time of discharge. Relative rest for 3 days- avoiding excessive activity. Place ice on the are for 15 minutes every 4 to 6 hours. Tylenol 1000 mg twice a day or ibuprofen 600 mg three times a day for the next 3-4 days if not on the medications already. Protect the area for the next 1-8 hours if anesthetic was used. Avoid excessive activity for next 3 to 4 weeks. ER precautions for fever, severe joint pain or allergic reaction to other new symptoms related to joint injection.?  ?   ATTENDING ADDENDUM  ?   Natalia Gomes?was evaluated at the time of the encounter with?Dr Calle.? HPI, PE and treatment plan was reviewed.? Treatment plan was reasonable and necessary.??Imaging was reviewed at the time of visit.??  ?   Medications  Actos 45 mg oral tablet, 45 mg= 1 tab(s), Oral, qAM  carvedilol 3.125 mg oral tablet, 3.125 mg= 1 tab(s), Oral, BID  Crestor 20 mg oral tablet, 20 mg= 1 tab(s), Oral, At Bedtime,? ?Not taking  DICLOFENAC GEL 1%, 1 gm= 1 gm, TOP, QID  escitalopram 10 mg oral tablet, 10 mg= 1 tab(s), Oral, Daily  esomeprazole 40 mg oral DR capsule, 40 mg= 1 cap(s), Oral, Daily  FUROSEMIDE TAB 40MG, 40 mg= 1 tab(s), Oral, qAM, PRN  Gemfibrozil 600 Mg Tablet, 600 mg= 1 tab(s), Oral, BID  Glimepiride 4 Mg Tablet, 4 mg= 1 tab(s), Oral, qAM  Hyalgan 10 mg/mL intra-articular solution, 20 mg= 2 mL, Intra-Articular, Once  Hyalgan 10 mg/mL intra-articular solution, 20 mg= 2 mL, Intra-Articular, Once  hydrochlorothiazide-valsartan 12.5 mg-80 mg oral tablet, 1 tab(s), Oral, qAM,? ?Investigating  isosorbide MONOnitrate 30 mg oral tablet, Extended Release, 30 mg= 1 tab(s), Oral, Daily  Kenalog-40 injectable suspension, 40 mg= 1 mL, Intra-Articular, Once  lamoTRIgine 100 mg oral tablet,  100 mg= 1 tab(s), Oral, BID  lidocaine 1% PF 2ml inj, 1 mL, Intra-Articular, Once  Linzess 290 mcg oral capsule, 290 mcg= 1 cap(s), Oral, Daily  lisinopril 2.5 mg oral tablet, 2.5 mg= 1 tab(s), Oral, Daily  metFORMIN 1000 mg oral tablet, 1000 mg= 1 tab(s), Oral, BID  Pazeo 0.7% ophthalmic solution, 1 drop(s), OPTH, Daily,? ?Not taking  rosuvastatin 40 mg oral tablet, 40 mg= 1 tab(s), Oral, qPM  Versed, 2 mg= 2 mL, IV Push, Once

## 2022-05-03 NOTE — HISTORICAL [facility] CERNER
This is a historical note converted from Lisbet. Formatting and pictures may have been removed.  Please reference Lisbet for original formatting and attached multimedia. Chief Complaint  c/o pressure pain on top and under right foot  History of Present Illness  51yF, DM (noninsulin dependent), with struck foot on picnic table and injured R 5th MT 2 months ago (3/25/17).? Fracture still mildly painful, but much improved from time of injury.? Pt was placed in a hardsole shoe and was instructed to be NWB RLE by her PCP however she says her crutches were uncomfortable and has been WBAT RLE.  Review of Systems  Constitutional_no fever, no chills  Eye_no trauma  Respiratory_no increased work of breathing  Cardiovascular_regular rate and rhythm  Gastrointestinal_no vomiting  Genitourinary_no burning with urination  Hema/Lymph_no hemorrhage  Musculoskeletal_see note  Integumentary_no rash over extremity  Neurologic_grossly intact, see PE section  Physical Exam  Vitals & Measurements  T:?36.7? ?C ?(Oral)? HR:?106?(Peripheral)? BP:?118/75? HT:?165?cm? HT:?165?cm? WT:?128.4?kg? WT:?128.4?kg? BMI:?47.16?  Gen:? NAD, A+Ox3  Cardio:? RRR  Pulm:? No increased WOB  ?   RLE:  Skin intact  No swelling  Mild TTP about fracture site.  Motor: intact ehl, fhl, ta, gs  SILT: t/s/s/sp/dp  2+ DP, bcr, wwp  Assessment/Plan  51yF with R 5th MT fracture now 2 months out from fracture  - Will place in cast, NWB for 4 weeks  - RTC 4 weeks with repeat XRs  - Counselled that if not healing is seen, then could consider surgery.   Problem List/Past Medical History  Arthritis  Depression  Diabetes  GERD (gastroesophageal reflux disease)  HTN (hypertension)  Hyperlipidaemia  Historical  No historical problems  Procedure/Surgical History  Cholecystectomy Laparoscopic (None) (07/06/2016), Laparoscopy, surgical; cholecystectomy (07/06/2016), Resection of Gallbladder, Percutaneous Endoscopic Approach (07/06/2016), Bladder neck operations,  Hemorrhoidectomy, Hysterectomy.  Medications  Actos 45 mg oral tablet, 45 mg, 1 tab(s), Oral, Daily  buPROPion 150 mg/24 hours (XL) oral tablet, extended release, 300 mg, Oral, q24hr  Crestor 20 mg oral tablet, 20 mg, 1 tab(s), Oral, At Bedtime  DULOXETINE 60MG CAP, 60 mg, 1 cap(s), Oral, Daily  glimepiride, 4 mg, Oral, BID  Glimepiride 4mg Tab, 4 mg, 1 tab(s), Oral, BID  hydrochlorothiazide-valsartan 12.5 mg-80 mg oral tablet, 1 tab(s), Oral, Daily  lamoTRIgine 100 mg oral tablet, 100 mg, 1 tab(s), Oral, BID  Lyrica 75 mg oral capsule, 75 mg, 1 cap(s), Oral, TID  metformin 500 mg oral tablet, 500 mg, 1 tab(s), Oral, BID  omeprazole 40 mg oral DR capsule, 40 mg, 1 cap(s), Oral, Daily  Polyeth Glyc 3350 Nf Pow  Premarin 0.45 mg oral tablet, 0.45 mg, 1 tab(s), Oral, Daily  Smz/Tmp Ds 800-160 Tab, 1 tab(s), Oral, BID  Tramadol Hcl 50mg Tab, 50 mg, 1 tab(s), Oral, TID  Trilipix 135 mg oral delayed release capsule, 135 mg, 1 cap(s), Oral, At Bedtime  Allergies  No Known Allergies  Social History  Alcohol  Current  Never  Substance Abuse  Never  Tobacco  Former smoker, 20 year(s).  Family History  Acute myocardial infarction.: Father.  Metastatic cancer: Brother.  Diagnostic Results  XR R foot:? Stinson fracture of R 5th MT      ?[facility] medical history, physical exam findings right foot , diagnosis, and treatment outlined by the PG3?resident has been reviewed.? Non operative treatment plan is determined to be reasonable and appropriate.?I was present during the evaluation. X-rays reviewed.

## 2022-05-03 NOTE — HISTORICAL [facility] CERNER
This is a historical note converted from Lisbet. Formatting and pictures may have been removed.  Please reference Lisbet for original formatting and attached multimedia. Chief Complaint  F/U visit: R foot--5th MT fx, R velazquez screw 8/8/17  History of Present Illness  52F here s/p R velazquez screw DOS 8/8.?Still having persistent pain with ambulation. Uchanged over last several months and not improving  Review of Systems  Neg  Physical Exam  Vitals & Measurements  HT:?154.94?cm? HT:?154.94?cm? WT:?130.4?kg? WT:?130.4?kg? BMI:?54.32?  RLE  Incision over lateral foot well healed  Motor intact EHL/FHL/GS/TA  SILT SP/DP/Vasquez/Sa/T.?  Assessment/Plan  Displaced fracture of fifth metatarsal bone, right foot, subsequent encounter for fracture with routine healing  ?Clearly shows signs of delayed union, risk factors include diabetes. Recommend tight glycemic control and calcium vitamin d supplemnetation. May continue wbat. In my opinion exogen would be beneficial to facilitate union and avoid potential complications. Will see back in 6 months after trial with exogen, if not further progress would consider full nonunion w/u  Ordered:  Clinic Follow up, *Est. 05/27/18 3:00:00 CDT, Order for future visit, Displaced fracture of fifth metatarsal bone, right foot, subsequent encounter for fracture with routine healing, [facility]  XR Foot Right Minimum 3 Views, Routine, 11/27/17 15:02:00 CST, Fracture, None, Ambulatory, Rad Type, Displaced fracture of fifth metatarsal bone, right foot, subsequent encounter for fracture with routine healing, 11/27/17 15:02:00 CST  ?  Orders:  XR Foot Right Minimum 3 Views, Routine, 11/27/17 15:02:00 CST, Fracture, None, Ambulatory, Rad Type, Displaced fracture of fifth metatarsal bone, right foot, subsequent encounter for fracture with routine healing, 11/27/17 15:02:00 CST   Problem List/Past Medical History  Ongoing  Arthritis  Depression  Diabetes  GERD (gastroesophageal reflux disease)  HTN  (hypertension)  Hyperlipidaemia  Metatarsal bone fracture  Historical  Procedure/Surgical History  Bone Graft (None) (08/08/2017)  Open treatment of metatarsal fracture, includes internal fixation, when performed, each (08/08/2017)  ORIF Metatarsal (Right) (08/08/2017)  Reposition Right Metatarsal with Internal Fixation Device, Open Approach (08/08/2017)  Colonoscopy (None) (07/13/2017)  COLORECTAL CANCER SCREENING; COLONOSCOPY ON INDIVIDUAL AT HIGH RISK (07/13/2017)  Inspection of Lower Intestinal Tract, Via Natural or Artificial Opening Endoscopic (07/13/2017)  Cholecystectomy Laparoscopic (None) (07/06/2016)  Laparoscopy, surgical; cholecystectomy (07/06/2016)  Resection of Gallbladder, Percutaneous Endoscopic Approach (07/06/2016)  Bladder neck operations  Colonoscopy, flexible; diagnostic, including collection of specimen(s) by brushing or washing, when performed (separate procedure)  Hemorrhoidectomy  Hysterectomy  Medications  Actos 45 mg oral tablet, 45 mg= 1 tab(s), Oral, Daily  buPROPion 150 mg/24 hours (XL) oral tablet, extended release, 300 mg, Oral, q24hr,? ?Not taking  Bupropion Hcl Xl 300 Mg Tablet, 300 mg= 1 tab(s), Oral, Daily  Crestor 20 mg oral tablet, 20 mg= 1 tab(s), Oral, At Bedtime  DULOXETINE 60MG CAP, 60 mg= 1 cap(s), Oral, Daily  Gemfibrozil 600 Mg Tablet, 600 mg= 1 tab(s), Oral, BID  glimepiride, 4 mg, Oral, BID  Glimepiride 4 Mg Tablet, 4 mg= 1 tab(s), Oral, BID  hydrochlorothiazide-valsartan 12.5 mg-80 mg oral tablet, 1 tab(s), Oral, Daily  lamoTRIgine 100 mg oral tablet, 100 mg= 1 tab(s), Oral, BID  Lyrica 75 mg oral capsule, 75 mg= 1 cap(s), Oral, TID  metformin 500 mg oral tablet, 500 mg= 1 tab(s), Oral, BID  omeprazole 40 mg oral DR capsule, 40 mg= 1 cap(s), Oral, Daily  Polyeth Glyc 3350 Nf Pow,? ?Not taking  Premarin 0.45 mg oral tablet, 0.45 mg= 1 tab(s), Oral, Daily  Tramadol Hcl 50mg Tab, 50 mg= 1 tab(s), Oral, TID  Versed, 2 mg= 2 mL, IV Push, Once  Allergies  No Known  Allergies  Social History  Alcohol - 08/02/2017  Never  Employment/School - 08/02/2017  Unemployed  Home/Environment - 08/02/2017  Lives with Children. Living situation: Home with assistance.  Substance Abuse - 08/02/2017  Never  Tobacco - 08/02/2017  Former smoker, 20 year(s).  Family History  Acute myocardial infarction.: Father.  Metastatic cancer: Brother.  Diagnostic Results  XR R foot: delayed union of velazquez fx with no hardware complications      I was present with the resident during the history and exam.  ???  [ x] I discussed the case with the resident and agree with the findings and plan as documented in the residents note.  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

## 2022-05-03 NOTE — HISTORICAL [facility] CERNER
This is a historical note converted from Lisbet. Formatting and pictures may have been removed.  Please reference Lisbet for original formatting and attached multimedia. Chief Complaint  F/U Rt Foot Fx  History of Present Illness  52F with hx of JENNIFER velazquez fx sustained March 2017 that was treated non operatively in cast and then hard sole shoe.? She was last seen last month and made PRN as the fx had shown signs of healing.? She returns today for re-eval due to continued pain and swelling  Review of Systems  Constitutional no fevers, chills  Eye neg  ENMT neg  Respiratory no shortness of breath  Cardiovascular no chest pain  Gastrointestinal no abdominal pain  Genitourinary neg  Hema/Lymph no easy bruising  Endocrine neg  Immunologic neg  Musculoskeletal as above  Integumentary as above  Neurologic as above  All Other ROS negative except as stated above  Physical Exam  Vitals & Measurements  T:?36.6? ?C ?(Oral)? HR:?67?(Monitored)? BP:?143/73? HT:?154.9?cm? HT:?154.9?cm? WT:?126.6?kg? WT:?126.6?kg? BMI:?52.76?  Gen: NAD, A&Ox3  Cardiac: RRR by PP  Pulm: non-labored WOB  Abd: soft, nt, nd  Ext  No gross deformities  TTP over 5th MT base  Motor intact EHL/FHL/GS/TA  SILT SP/DP/Vasquez/SaT  2+ DP/PT  Assessment/Plan  1.?Displaced fracture of fifth metatarsal bone, right foot, initial encounter for closed fracture  ?- Risks and benefits to operative vs continued non-operative management were discussed and the patient would like to proceed with ORIF of R 5th MT.? Her fracture is now 4 months out and is?unlikely to heal?with continued non-operative management. ?She understands that this?may be difficult to get the?bone to heal and may require bone grafting.??  ?- consented and booked for 8/8/17  Ordered:  Clinic Follow-up PRN, 07/26/17 12:12:00 CDT  Hemoglobin A1C [facility], Routine collect, *Est. 08/02/17 3:00:00 CDT, Blood, Order for future visit, *Est. Stop date 08/02/17 3:00:00 CDT, Lab Collect, Displaced fracture of fifth  metatarsal bone, right foot, initial encounter for closed fracture, 1, week(s), In Approximately...  ?   Problem List/Past Medical History  Arthritis  Depression  Diabetes  GERD (gastroesophageal reflux disease)  HTN (hypertension)  Hyperlipidaemia  Historical  No historical problems  Procedure/Surgical History  Colonoscopy (None) (07/13/2017), COLORECTAL CANCER SCREENING; COLONOSCOPY ON INDIVIDUAL AT HIGH RISK (07/13/2017), Inspection of Lower Intestinal Tract, Via Natural or Artificial Opening Endoscopic (07/13/2017), Cholecystectomy Laparoscopic (None) (07/06/2016), Laparoscopy, surgical; cholecystectomy (07/06/2016), Resection of Gallbladder, Percutaneous Endoscopic Approach (07/06/2016), Bladder neck operations, Colonoscopy, flexible; diagnostic, including collection of specimen(s) by brushing or washing, when performed (separate procedure), Hemorrhoidectomy, Hysterectomy.  Medications  Actos 45 mg oral tablet, 45 mg, 1 tab(s), Oral, Daily  buPROPion 150 mg/24 hours (XL) oral tablet, extended release, 300 mg, Oral, q24hr  Crestor 20 mg oral tablet, 20 mg, 1 tab(s), Oral, At Bedtime  DULOXETINE 60MG CAP, 60 mg, 1 cap(s), Oral, Daily  glimepiride, 4 mg, Oral, BID  Glimepiride 4mg Tab, 4 mg, 1 tab(s), Oral, BID  hydrochlorothiazide-valsartan 12.5 mg-80 mg oral tablet, 1 tab(s), Oral, Daily  lamoTRIgine 100 mg oral tablet, 100 mg, 1 tab(s), Oral, BID  Lyrica 75 mg oral capsule, 75 mg, 1 cap(s), Oral, TID  metformin 500 mg oral tablet, 500 mg, 1 tab(s), Oral, BID  omeprazole 40 mg oral DR capsule, 40 mg, 1 cap(s), Oral, Daily  Polyeth Glyc 3350 Nf Pow  Premarin 0.45 mg oral tablet, 0.45 mg, 1 tab(s), Oral, Daily  Smz/Tmp Ds 800-160 Tab, 1 tab(s), Oral, BID,? ?Not taking  Tramadol Hcl 50mg Tab, 50 mg, 1 tab(s), Oral, TID  Trilipix 135 mg oral delayed release capsule, 135 mg, 1 cap(s), Oral, At Bedtime  Allergies  No Known Allergies  Social  History  Alcohol  Current  Never  Employment/School  Unemployed  Home/Environment  Lives with Children. Living situation: Home with assistance.  Substance Abuse  Never  Tobacco  Former smoker, 20 year(s).  Family History  Acute myocardial infarction.: Father.  Metastatic cancer: Brother.  Diagnostic Results  Xrays of the foot - reveal persistent fracture in watershed area      ?? [facility] medical history, physical exam findings right foot , diagnosis, and treatment outlined by the PG3?resident has been reviewed.? Operative treatment plan is determined to be reasonable and appropriate.?I was present during the evaluation. X-rays reviewed.

## 2022-05-03 NOTE — HISTORICAL [facility] CERNER
This is a historical note converted from Lisbet. Formatting and pictures may have been removed.  Please reference Lisbet for original formatting and attached multimedia. Chief Complaint  f/u right 5th mt fx  History of Present Illness  52F here for 6 week follow up from [place].? Pain has completely subsided.? Has maintained NWB except for occasionally having to put her foot on the ground for transfers.? Has a WC and RW at home.  Physical Exam  Vitals & Measurements  HT:?154?cm? HT:?154?cm? WT:?125.7?kg? WT:?125.7?kg? BMI:?53?  RLE  Incision over lateral foot well healed  Motor intact EHL/FHL/GS/TA  SILT SP/DP/Vasquez/Sa/T.? Mild decreased sensation over sural nerve distal to incision, improved since last visit  2+ DP/PT  Assessment/Plan  1.?Displaced fracture of fifth metatarsal bone, right foot, subsequent encounter for fracture with routine healing  ? Pain has subsided and radiographs show?signs of significant healing.? Will allow to progress WB with a RW and CAM boot.?  ? RTC in 6 weeks with films  Ordered:  Clinic Follow up, *Est. 11/06/17 3:00:00 CST, Order for future visit, Displaced fracture of fifth metatarsal bone, right foot, subsequent encounter for fracture with routine healing, [facility]  XR Foot Right Minimum 3 Views, Routine, *Est. 11/06/17 3:00:00 CST, Fracture, None, Wheelchair, Patient Has IV?, Rad Type, Order for future visit, Displaced fracture of fifth metatarsal bone, right foot, subsequent encounter for fracture with routine healing, 6, week(s), In Approxi...  ?   Problem List/Past Medical History  Ongoing  Arthritis  Depression  Diabetes  GERD (gastroesophageal reflux disease)  HTN (hypertension)  Hyperlipidaemia  Metatarsal bone fracture  Historical  Procedure/Surgical History  Bone Graft (None) (08/08/2017)  Open treatment of metatarsal fracture, includes internal fixation, when performed, each (08/08/2017)  ORIF Metatarsal (Right) (08/08/2017)  Reposition Right Metatarsal with  Internal Fixation Device, Open Approach (08/08/2017)  Colonoscopy (None) (07/13/2017)  COLORECTAL CANCER SCREENING; COLONOSCOPY ON INDIVIDUAL AT HIGH RISK (07/13/2017)  Inspection of Lower Intestinal Tract, Via Natural or Artificial Opening Endoscopic (07/13/2017)  Cholecystectomy Laparoscopic (None) (07/06/2016)  Laparoscopy, surgical; cholecystectomy (07/06/2016)  Resection of Gallbladder, Percutaneous Endoscopic Approach (07/06/2016)  Bladder neck operations  Colonoscopy, flexible; diagnostic, including collection of specimen(s) by brushing or washing, when performed (separate procedure)  Hemorrhoidectomy  Hysterectomy  Medications  Actos 45 mg oral tablet, 45 mg= 1 tab(s), Oral, Daily  buPROPion 150 mg/24 hours (XL) oral tablet, extended release, 300 mg, Oral, q24hr  Crestor 20 mg oral tablet, 20 mg= 1 tab(s), Oral, At Bedtime  DULOXETINE 60MG CAP, 60 mg= 1 cap(s), Oral, Daily  Gemfibrozil 600 Mg Tablet, 600 mg= 1 tab(s), Oral, BID  glimepiride, 4 mg, Oral, BID  hydrochlorothiazide-valsartan 12.5 mg-80 mg oral tablet, 1 tab(s), Oral, Daily  lamoTRIgine 100 mg oral tablet, 100 mg= 1 tab(s), Oral, BID  Lyrica 75 mg oral capsule, 75 mg= 1 cap(s), Oral, TID  metformin 500 mg oral tablet, 500 mg= 1 tab(s), Oral, BID  omeprazole 40 mg oral DR capsule, 40 mg= 1 cap(s), Oral, Daily  Polyeth Glyc 3350 Nf Pow  Premarin 0.45 mg oral tablet, 0.45 mg= 1 tab(s), Oral, Daily  Tramadol Hcl 50mg Tab, 50 mg= 1 tab(s), Oral, TID  Trilipix 135 mg oral delayed release capsule, 135 mg= 1 cap(s), Oral, At Bedtime,? ?Not taking  Versed, 2 mg= 2 mL, IV Push, Once  Allergies  No Known Allergies  Social History  Alcohol - 08/02/2017  Never  Employment/School - 08/02/2017  Unemployed  Home/Environment - 08/02/2017  Lives with Children. Living situation: Home with assistance.  Substance Abuse - 08/02/2017  Never  Tobacco - 08/02/2017  Former smoker, 20 year(s).  Family History  Acute myocardial infarction.: Father.  Metastatic cancer:  Brother.  Diagnostic Results  xrays show signs of bridging bone around her velazquez screw      ? [place]?medical history, physical exam findings right foot , diagnosis, and treatment outlined by Dr. Alfaro?has been reviewed.? Post operative treatment plan is determined to be reasonable and appropriate.?I was present during the evaluation. X-rays reviewed.

## 2022-05-03 NOTE — HISTORICAL [facility] CERNER
This is a historical note converted from Lisbet. Formatting and pictures may have been removed.  Please reference Lisbet for original formatting and attached multimedia. Chief Complaint  right foot  History of Present Illness  ?  55 Years?old ?RHD?Female?non-smoker?presents to?sports medicine clinic?for?initial evaluation?for bilateral feet pain.  Patient states that over the past 3-4 months she has been having bilateral heel pain that goes to her lateral and midfoot. Insidious in onset. Painful to step on in the morning, and walking all day. Does not changes with activity, is constant. Takes occasional Tylenol with minimal relief.  ?   Previously seen PCP.  Previous treatment:?none  previous injuries:?denies  Current pain level: 4/10 ( rated as?none)?without pain medication  associated symptoms:?no numbness and tingling;?no swelling;?no skin changes;?no weakness;?no decrease in ROM  Family history:?non contributory  ?  ?  Review of Systems  Constitutional: no fever, no chills, no weight loss  CV: no swelling, no edema  : no urinary retention, no urinary incontinence  GI: no fecal incontinence  Skin: no rash, no wound  Neuro: no numbness/tingling, no weakness, no saddle anesthesia  MSK: as above  Psych: no depression, no anxiety  Heme/Lymph: no easy bruising, no easy bleeding, no lymphadenopathy  Immuno: no allergic reaction, no recurrent infections  Physical Exam  Vitals & Measurements  HR:?76(Peripheral)? BP:?100/66?  HT:?158.00?cm?  General: well developed; well nourished; cooperative  PSYCH: alert and oriented with?appropriate mood and affect  SKIN: inspection and palpation of skin and soft tissue normal; no scars noted on upper/lower extremities  CV: vascular integrity noted; +2 symmetrical pulses, no edema  NEURO: sensation intact by light touch;  LYMPH: no LAD noted  ?   R Foot  Inspection: no swelling, no erythema, no edema  Current footwear: tennis shoes  Palpation: TTP at mid heel into the plantar  midfoot  ?   ROM:  Plantar Flexion (0-45): 45 degrees  Dorsiflexion (0-20): 20 degrees  Pronation?(0-20): 20 degrees  Supination?(0-30): 30 degrees  Strength: 5/5 in all planes  ?   Special Tests:  Anterior Drawer: negative  Talar Tilt: negative  ?  ?   L Foot  Inspection: no swelling, no erythema, no edema  Current footwear: tennis shoes  Palpation: TTP of mid heel, into the lateral plantar aspect  ?   ROM:  Plantar Flexion (0-45): 45 degrees  Dorsiflexion (0-20): 20 degrees  Pronation?(0-20): 20 degrees  Supination?(0-30): 30 degrees  Strength: 5/5 in all planes  ?   Special Tests:  Anterior Drawer: negative  Talar?Tilt: negative  ?  ?  ?  Assessment/Plan  1.?Plantar fasciitis?M72.2  History and physical exam consistent with bilateral plantar fasciitis. Has not tried conservative management at this time. Recommend foot orthotics, topical creams, OTC pain medications, and feet stretches.  ?  Imaging: Xray ordered and interpreted by me, discussed with patient.  Activity:?Activity as tolerated  Therapy:?HEP, handout for plantar fasciitis exercises given  Medication:?OTC NSAIDs or Tylenol prn. Recommended Voltaren Gel and Aspercreme  RTC:?PRN; call if any issues. If no improvement after few months of conservative management can return and consider CSI.  Imaging:?none  Additional work-up:?none  ?  2.?Morbid obesity?E66.01  ?-Weight loss and lifestyle modification advised  Discussed with the fellow at time of visit? Patient chart reviewed. Patient seen and evaluated at time of visit.?HPI, PE, and Assessment and Plan reviewed. Treatment plan is reasonable and appropriate.? All questions were answered.??Compliance with treatment plan is appropriate.  ?Radiology images independently reviewed and agree with radiologist. ?Radiology images independently reviewed and agree with fellow.?-Amando Gomez, DO CAQSM   Medications  acetaminophen-hydrocodone 325 mg-7.5 mg oral tablet  Actos 45 mg oral tablet, 45 mg= 1 tab(s), Oral,  qAM  Amox Tr-K Clv 875-125 Mg Tab, 1 tab(s), Oral, BID,? ?Not taking  azithromycin 250 mg oral tablet, Oral,? ?Not taking  Azithromycin 500 Mg Tablet, 500 mg= 1 tab(s), Oral, Daily,? ?Not taking  CETIRIZINE TAB 10MG, 10 mg= 1 tab(s), Oral, Daily,? ?Not taking  Crestor 20 mg oral tablet, 20 mg= 1 tab(s), Oral, At Bedtime  CYCLOBENZAPR TAB 10MG, Oral,? ?Not taking  DICLOFENAC GEL 1%, 1 gm= 1 gm, TOP, QID  esomeprazole 40 mg oral DR capsule, 40 mg= 1 cap(s), Oral, Daily,? ?Not taking  FLUTICASONE SPR 50MCG,? ?Not taking  FUROSEMIDE TAB 40MG, 40 mg= 1 tab(s), Oral, qAM, PRN  GABAPENTIN TAB 800MG, 800 mg= 1 tab(s), Oral, TID  Gemfibrozil 600 Mg Tablet, 600 mg= 1 tab(s), Oral, BID  Glimepiride 4 Mg Tablet, 4 mg= 1 tab(s), Oral, qAM  Hyalgan 10 mg/mL intra-articular solution, 20 mg= 2 mL, Intra-Articular, Once  Hyalgan 10 mg/mL intra-articular solution, 20 mg= 2 mL, Intra-Articular, Once  hydrochlorothiazide-valsartan 12.5 mg-80 mg oral tablet, 1 tab(s), Oral, qAM  Ipratrop Bromid 0.06% Spr,? ?Not taking  KETOTIF FUM IGNACIO 0.025%OP,? ?Not taking  lamoTRIgine 100 mg oral tablet, 100 mg= 1 tab(s), Oral, BID  Linzess 290 mcg oral capsule, 290 mcg= 1 cap(s), Oral, Daily  metFORMIN 1000 mg oral tablet, 1000 mg= 1 tab(s), Oral, BID  metformin 500 mg oral tablet, 500 mg= 1 tab(s), Oral, BID,? ?Not taking  NAPROXEN TAB 500MG, 500 mg= 1 tab(s), Oral, BID,? ?Not taking  omeprazole 40 mg oral DR capsule, 40 mg= 1 cap(s), Oral, qAM,? ?Not taking  ondansetron 8 mg oral tablet, disintegrating, 8 mg= 1 tab(s), Oral, TID,? ?Not taking  P[place]o 0.7% ophthalmic solution, 1 drop(s), OPTH, Daily  Premarin 0.45 mg oral tablet, 0.45 mg= 1 tab(s), Oral, qAM  Tramadol Hcl 50mg Tab, 50 mg= 1 tab(s), Oral, TID,? ?Not taking  TRINTELLIX TAB 10MG, 10 mg= 1 tab(s), Oral, qAM,? ?Not taking  valACYclovir 1 g oral tablet, 1 gm= 1 tab(s), Oral, BID,? ?Not taking  Ventolin Hfa Aer,? ?Not taking  Versed, 2 mg= 2 mL, IV Push, Once  Diagnostic Results  Xray right  ankle 1/21/20: On my read, screw noted in 5th MT. Calcaneal bone spur noted. Possible old fracture at distal tibia.?Rest per radiologist  ?  Xray left ankle 1/21/20: On my read, calcaneal bone spur noted. Rest per radiologist.

## 2022-05-03 NOTE — HISTORICAL [facility]
This is a historical note converted from Lisbet. Formatting and pictures may have been removed.  Please reference Lisbet for original formatting and attached multimedia. Chief Complaint  f/u right 5th MT fracture  History of Present Illness  51yF, DM (noninsulin dependent), with struck foot on picnic table and injured R 5th MT 2 months ago (3/25/17).? Fracture still mildly painful, but much improved from time of injury.? Improved pain since cast was applied.? No pain at fracture site while ambulating.  Review of Systems  Constitutional_no fever, no chills  Eye_no trauma  Respiratory_no increased work of breathing  Cardiovascular_regular rate and rhythm  Gastrointestinal_no vomiting  Genitourinary_no burning with urination  Hema/Lymph_no hemorrhage  Musculoskeletal_see note  Integumentary_no rash over extremity  Neurologic_grossly intact, see PE section  Physical Exam  Vitals & Measurements  T:?36.9? ?C ?(Oral)? HR:?97?(Peripheral)? BP:?120/80? HT:?154.94?cm? HT:?154.94?cm? WT:?120?kg? WT:?120?kg? BMI:?49.99?  Gen:? NAD, A+Ox3  Cardio:? RRR  Pulm:? No increased WOB  ?   RLE:  Skin intact  No swelling  Mild TTP about fracture site  Motor: intact ehl, fhl, ta, gs  SILT: t/s/s/sp/dp  2+ DP, bcr, wwp  No pain at fracture site while ambulating  No limp  Assessment/Plan  Nondisplaced fracture of fifth metatarsal bone, right foot, initial encounter for closed fracture  ?- Based on clinical exam and radiographs, the fracture appears to be healed enough for patient to return to normal activity.  - Will provide the patient with a hardsole shoe which she will wear for 2 weeks, then half time for 1 week, then discontinue after that.  - Will have her follow up on a prn basis.   Problem List/Past Medical History  Arthritis  Depression  Diabetes  GERD (gastroesophageal reflux disease)  HTN (hypertension)  Hyperlipidaemia  Historical  No historical problems  Procedure/Surgical History  Cholecystectomy Laparoscopic (None)  (07/06/2016), Laparoscopy, surgical; cholecystectomy (07/06/2016), Resection of Gallbladder, Percutaneous Endoscopic Approach (07/06/2016), Bladder neck operations, Hemorrhoidectomy, Hysterectomy.  Medications  Actos 45 mg oral tablet, 45 mg, 1 tab(s), Oral, Daily  buPROPion 150 mg/24 hours (XL) oral tablet, extended release, 300 mg, Oral, q24hr  Crestor 20 mg oral tablet, 20 mg, 1 tab(s), Oral, At Bedtime  DULOXETINE 60MG CAP, 60 mg, 1 cap(s), Oral, Daily  glimepiride, 4 mg, Oral, BID  Glimepiride 4mg Tab, 4 mg, 1 tab(s), Oral, BID  hydrochlorothiazide-valsartan 12.5 mg-80 mg oral tablet, 1 tab(s), Oral, Daily  lamoTRIgine 100 mg oral tablet, 100 mg, 1 tab(s), Oral, BID  Lyrica 75 mg oral capsule, 75 mg, 1 cap(s), Oral, TID  metformin 500 mg oral tablet, 500 mg, 1 tab(s), Oral, BID  omeprazole 40 mg oral DR capsule, 40 mg, 1 cap(s), Oral, Daily  Polyeth Glyc 3350 Nf Pow  Premarin 0.45 mg oral tablet, 0.45 mg, 1 tab(s), Oral, Daily  Smz/Tmp Ds 800-160 Tab, 1 tab(s), Oral, BID,? ?Not taking  Tramadol Hcl 50mg Tab, 50 mg, 1 tab(s), Oral, TID  Trilipix 135 mg oral delayed release capsule, 135 mg, 1 cap(s), Oral, At Bedtime  Allergies  No Known Allergies  Social History  Alcohol  Current  Never  Substance Abuse  Never  Tobacco  Former smoker, 20 year(s).  Family History  Acute myocardial infarction.: Father.  Metastatic cancer: Brother.  Diagnostic Results  XR of R foot:? healing fracture of R 5th MT base,? medial aspect of fracture appears healed, lateral portion of fracture not fully healed.      ? [place]?medical history, physical exam findings right foot , diagnosis, and treatment outlined by the [facility]?resident has been reviewed.? Treatment plan is determined to be reasonable and appropriate.?I was present during the evaluation. X-rays right foot?reviewed.

## 2022-05-03 NOTE — HISTORICAL [facility]
This is a historical note converted from Lisbet. Formatting and pictures may have been removed.  Please reference Lisbet for original formatting and attached multimedia. Chief Complaint  F/U Rt Foot Fx.  History of Present Illness  52F here for first post op of?IM screw for?R velazquez fx.? Doing well.? Has maintained NWB for most part except for transfers to the bathroom.  Physical Exam  Vitals & Measurements  HT:?154.94?cm? HT:?154.94?cm? WT:?126.6?kg? WT:?126.6?kg? BMI:?52.74?  RLE  Incision over lateral foot healing with suture line intact  Motor intact EHL/FHL/GS/TA  SILT SP/DP/Vasquez/Sa/T.? Mild decreased sensation over sural nerve distal ton incision  2+ DP/PT  Assessment/Plan  1.?Metatarsal bone fracture  ?Pt doing well.? Transition to?CAM boot today.? NWB RLE for at least 4 more weeks.? Sutures out today.? RTC in 4 weeks with films  Ordered:  Clinic Follow up  XR Foot Right Minimum 3 Views  ?   Problem List/Past Medical History  Ongoing  Arthritis  Depression  Diabetes  GERD (gastroesophageal reflux disease)  HTN (hypertension)  Hyperlipidaemia  Metatarsal bone fracture  Historical  Procedure/Surgical History  Bone Graft (None) (08/08/2017)  Open treatment of metatarsal fracture, includes internal fixation, when performed, each (08/08/2017)  ORIF Metatarsal (Right) (08/08/2017)  Reposition Right Metatarsal with Internal Fixation Device, Open Approach (08/08/2017)  Colonoscopy (None) (07/13/2017)  COLORECTAL CANCER SCREENING; COLONOSCOPY ON INDIVIDUAL AT HIGH RISK (07/13/2017)  Inspection of Lower Intestinal Tract, Via Natural or Artificial Opening Endoscopic (07/13/2017)  Cholecystectomy Laparoscopic (None) (07/06/2016)  Laparoscopy, surgical; cholecystectomy (07/06/2016)  Resection of Gallbladder, Percutaneous Endoscopic Approach (07/06/2016)  Bladder neck operations  Colonoscopy, flexible; diagnostic, including collection of specimen(s) by brushing or washing, when performed (separate  procedure)  Hemorrhoidectomy  Hysterectomy  Medications  Actos 45 mg oral tablet, 45 mg= 1 tab(s), Oral, Daily  buPROPion 150 mg/24 hours (XL) oral tablet, extended release, 300 mg, Oral, q24hr  Crestor 20 mg oral tablet, 20 mg= 1 tab(s), Oral, At Bedtime  DULOXETINE 60MG CAP, 60 mg= 1 cap(s), Oral, Daily  Gemfibrozil 600 Mg Tablet, 600 mg= 1 tab(s), Oral, BID  glimepiride, 4 mg, Oral, BID  hydrochlorothiazide-valsartan 12.5 mg-80 mg oral tablet, 1 tab(s), Oral, Daily  lamoTRIgine 100 mg oral tablet, 100 mg= 1 tab(s), Oral, BID  Lyrica 75 mg oral capsule, 75 mg= 1 cap(s), Oral, TID  metformin 500 mg oral tablet, 500 mg= 1 tab(s), Oral, BID  omeprazole 40 mg oral DR capsule, 40 mg= 1 cap(s), Oral, Daily  Polyeth Glyc 3350 Nf Pow  Premarin 0.45 mg oral tablet, 0.45 mg= 1 tab(s), Oral, Daily  Tramadol Hcl 50mg Tab, 50 mg= 1 tab(s), Oral, TID  Trilipix 135 mg oral delayed release capsule, 135 mg= 1 cap(s), Oral, At Bedtime,? ?Not taking  Versed, 2 mg= 2 mL, IV Push, Once  Allergies  No Known Allergies  Social History  Alcohol - 08/02/2017  Never  Employment/School - 08/02/2017  Unemployed  Home/Environment - 08/02/2017  Lives with Children. Living situation: Home with assistance.  Substance Abuse - 08/02/2017  Never  Tobacco - 08/02/2017  Former smoker, 20 year(s).  Family History  Acute myocardial infarction.: Father.  Metastatic cancer: Brother.  Diagnostic Results  xrays reveal well fixed and aligned hardware.? No signs of healing as of yet      ?? Alexis medical history, physical exam findings right foot , diagnosis, and treatment outlined by?Dr. Alfaro?has been reviewed.? Post operative treatment plan is determined to be reasonable and appropriate.?I was present during the evaluation. X-rays reviewed.

## 2022-09-12 DIAGNOSIS — Z12.31 ENCOUNTER FOR SCREENING MAMMOGRAM FOR BREAST CANCER: Primary | ICD-10-CM

## 2022-09-15 ENCOUNTER — HOSPITAL ENCOUNTER (OUTPATIENT)
Dept: RADIOLOGY | Facility: HOSPITAL | Age: 57
Discharge: HOME | End: 2022-09-15
Attending: FAMILY MEDICINE
Payer: MEDICAID

## 2022-09-15 DIAGNOSIS — Z12.31 ENCOUNTER FOR SCREENING MAMMOGRAM FOR BREAST CANCER: ICD-10-CM

## 2022-09-15 PROCEDURE — 77063 BREAST TOMOSYNTHESIS BI: CPT | Mod: 26,,, | Performed by: STUDENT IN AN ORGANIZED HEALTH CARE EDUCATION/TRAINING PROGRAM

## 2022-09-15 PROCEDURE — 77063 MAMMO DIGITAL SCREENING BILAT WITH TOMO: ICD-10-PCS | Mod: 26,,, | Performed by: STUDENT IN AN ORGANIZED HEALTH CARE EDUCATION/TRAINING PROGRAM

## 2022-09-15 PROCEDURE — 77067 SCR MAMMO BI INCL CAD: CPT | Mod: 26,,, | Performed by: STUDENT IN AN ORGANIZED HEALTH CARE EDUCATION/TRAINING PROGRAM

## 2022-09-15 PROCEDURE — 77067 SCR MAMMO BI INCL CAD: CPT | Mod: TC

## 2022-09-15 PROCEDURE — 77067 MAMMO DIGITAL SCREENING BILAT WITH TOMO: ICD-10-PCS | Mod: 26,,, | Performed by: STUDENT IN AN ORGANIZED HEALTH CARE EDUCATION/TRAINING PROGRAM

## 2022-12-06 DIAGNOSIS — Z86.018 S/P EXCISION OF LIPOMA: Primary | ICD-10-CM

## 2022-12-06 DIAGNOSIS — Z98.890 S/P EXCISION OF LIPOMA: Primary | ICD-10-CM

## 2023-01-09 ENCOUNTER — OFFICE VISIT (OUTPATIENT)
Dept: PLASTIC SURGERY | Facility: CLINIC | Age: 58
End: 2023-01-09
Payer: MEDICAID

## 2023-01-09 VITALS
OXYGEN SATURATION: 98 % | RESPIRATION RATE: 18 BRPM | SYSTOLIC BLOOD PRESSURE: 154 MMHG | DIASTOLIC BLOOD PRESSURE: 79 MMHG | WEIGHT: 293 LBS | HEART RATE: 85 BPM | HEIGHT: 61 IN | BODY MASS INDEX: 55.32 KG/M2

## 2023-01-09 DIAGNOSIS — Z98.890 S/P EXCISION OF LIPOMA: ICD-10-CM

## 2023-01-09 DIAGNOSIS — Z86.018 S/P EXCISION OF LIPOMA: ICD-10-CM

## 2023-01-09 PROCEDURE — 3078F PR MOST RECENT DIASTOLIC BLOOD PRESSURE < 80 MM HG: ICD-10-PCS | Mod: CPTII,,, | Performed by: SURGERY

## 2023-01-09 PROCEDURE — 99203 OFFICE O/P NEW LOW 30 MIN: CPT | Mod: S$PBB,,, | Performed by: SURGERY

## 2023-01-09 PROCEDURE — 1159F MED LIST DOCD IN RCRD: CPT | Mod: CPTII,,, | Performed by: SURGERY

## 2023-01-09 PROCEDURE — 3077F PR MOST RECENT SYSTOLIC BLOOD PRESSURE >= 140 MM HG: ICD-10-PCS | Mod: CPTII,,, | Performed by: SURGERY

## 2023-01-09 PROCEDURE — 1159F PR MEDICATION LIST DOCUMENTED IN MEDICAL RECORD: ICD-10-PCS | Mod: CPTII,,, | Performed by: SURGERY

## 2023-01-09 PROCEDURE — 3008F PR BODY MASS INDEX (BMI) DOCUMENTED: ICD-10-PCS | Mod: CPTII,,, | Performed by: SURGERY

## 2023-01-09 PROCEDURE — 3077F SYST BP >= 140 MM HG: CPT | Mod: CPTII,,, | Performed by: SURGERY

## 2023-01-09 PROCEDURE — 3078F DIAST BP <80 MM HG: CPT | Mod: CPTII,,, | Performed by: SURGERY

## 2023-01-09 PROCEDURE — 99214 OFFICE O/P EST MOD 30 MIN: CPT | Mod: PBBFAC | Performed by: SURGERY

## 2023-01-09 PROCEDURE — 3008F BODY MASS INDEX DOCD: CPT | Mod: CPTII,,, | Performed by: SURGERY

## 2023-01-09 PROCEDURE — 99203 PR OFFICE/OUTPT VISIT, NEW, LEVL III, 30-44 MIN: ICD-10-PCS | Mod: S$PBB,,, | Performed by: SURGERY

## 2023-01-09 RX ORDER — FUROSEMIDE 40 MG/1
40 TABLET ORAL DAILY
COMMUNITY
Start: 2023-01-02

## 2023-01-09 RX ORDER — LINACLOTIDE 290 UG/1
290 CAPSULE, GELATIN COATED ORAL EVERY MORNING
COMMUNITY
Start: 2023-01-02

## 2023-01-09 RX ORDER — METFORMIN HYDROCHLORIDE 1000 MG/1
TABLET ORAL
COMMUNITY
Start: 2023-01-02

## 2023-01-09 RX ORDER — GEMFIBROZIL 600 MG/1
600 TABLET, FILM COATED ORAL 2 TIMES DAILY
COMMUNITY
Start: 2023-01-02

## 2023-01-09 RX ORDER — GLIMEPIRIDE 4 MG/1
4 TABLET ORAL 2 TIMES DAILY
COMMUNITY
Start: 2023-01-02

## 2023-01-09 RX ORDER — SERTRALINE HYDROCHLORIDE 100 MG/1
100 TABLET, FILM COATED ORAL EVERY MORNING
COMMUNITY
Start: 2023-01-02

## 2023-01-09 RX ORDER — EZETIMIBE 10 MG/1
10 TABLET ORAL EVERY MORNING
COMMUNITY
Start: 2023-01-02

## 2023-01-09 RX ORDER — OLOPATADINE HYDROCHLORIDE 1 MG/ML
1 SOLUTION/ DROPS OPHTHALMIC 2 TIMES DAILY
COMMUNITY
Start: 2022-11-08

## 2023-01-09 RX ORDER — TRAMADOL HYDROCHLORIDE 50 MG/1
50 TABLET ORAL EVERY 8 HOURS PRN
COMMUNITY
Start: 2023-01-02

## 2023-01-09 RX ORDER — ESTROGENS, CONJUGATED 0.45 MG/1
0.45 TABLET, FILM COATED ORAL EVERY MORNING
COMMUNITY
Start: 2023-01-02

## 2023-01-09 RX ORDER — PIOGLITAZONEHYDROCHLORIDE 45 MG/1
45 TABLET ORAL EVERY MORNING
COMMUNITY

## 2023-01-09 RX ORDER — ROSUVASTATIN CALCIUM 40 MG/1
40 TABLET, COATED ORAL EVERY MORNING
COMMUNITY
Start: 2023-01-02

## 2023-01-09 RX ORDER — SEMAGLUTIDE 1.34 MG/ML
INJECTION, SOLUTION SUBCUTANEOUS
COMMUNITY
Start: 2023-01-05

## 2023-01-09 RX ORDER — LISINOPRIL 2.5 MG/1
2.5 TABLET ORAL EVERY MORNING
COMMUNITY
Start: 2022-09-02

## 2023-01-09 RX ORDER — CARVEDILOL 3.12 MG/1
3.12 TABLET ORAL 2 TIMES DAILY
COMMUNITY
Start: 2023-01-02

## 2023-01-09 RX ORDER — LAMOTRIGINE 100 MG/1
100 TABLET ORAL 2 TIMES DAILY
COMMUNITY
Start: 2023-01-02

## 2023-01-09 RX ORDER — ESOMEPRAZOLE MAGNESIUM 40 MG/1
40 CAPSULE, DELAYED RELEASE ORAL EVERY MORNING
COMMUNITY

## 2023-02-17 ENCOUNTER — TELEPHONE (OUTPATIENT)
Dept: OBSTETRICS AND GYNECOLOGY | Facility: CLINIC | Age: 58
End: 2023-02-17
Payer: MEDICAID

## 2023-02-17 DIAGNOSIS — N83.209 CYST OF OVARY, UNSPECIFIED LATERALITY: Primary | ICD-10-CM

## 2023-02-22 NOTE — TELEPHONE ENCOUNTER
Notified pt will confirm with Dr. Morris regarding repeat US and labs. Will notify her of plan tomorrow by the end of day. Pt verbalized understanding.

## 2023-02-23 NOTE — TELEPHONE ENCOUNTER
Confirmed with Dr. Morris order for pelvic US and . Orders to OA. Pt notified of orders. Instructed her to keep scheduled GYN exam. Pt verbalized understanding.

## 2023-05-09 ENCOUNTER — HOSPITAL ENCOUNTER (OUTPATIENT)
Dept: RADIOLOGY | Facility: HOSPITAL | Age: 58
Discharge: HOME | End: 2023-05-09
Attending: OBSTETRICS & GYNECOLOGY
Payer: MEDICAID

## 2023-05-09 DIAGNOSIS — N83.209 CYST OF OVARY, UNSPECIFIED LATERALITY: ICD-10-CM

## 2023-05-09 PROCEDURE — 76856 US EXAM PELVIC COMPLETE: CPT | Mod: TC

## 2023-05-10 ENCOUNTER — TELEPHONE (OUTPATIENT)
Dept: OBSTETRICS AND GYNECOLOGY | Facility: CLINIC | Age: 58
End: 2023-05-10
Payer: MEDICAID

## 2023-05-10 NOTE — TELEPHONE ENCOUNTER
Per MALOU Morris, pt is welcome to come & wait to be fitted in if possible, pt verbalized understanding of possibility of waiting for extend time for us to try & fit her in our already booked schedule. Her daughters appt is at 8:30am jose Elizalde

## 2023-05-10 NOTE — TELEPHONE ENCOUNTER
----- Message from Dora Sadler sent at 5/10/2023 11:31 AM CDT -----  Regarding: Nurse call  .Type:  Needs Medical Advice    Who Called:  patient  Best Call Back Number:  [phone]  Additional Information:  wants to move her appt to tomorrow because her daughter has an appt tomorrow morning and she does not want to come back an forth 2 days in a row, does not have gas to do both days; informed patient it is for her well visit- states she normally just comes for results, no exams- unsure what this appt is about, but still wants to be seen tomorrow- will wait around for him even after being told he is in surgery in the morning and will not be in until after 10

## 2023-05-11 ENCOUNTER — LAB VISIT (OUTPATIENT)
Dept: LAB | Facility: HOSPITAL | Age: 58
End: 2023-05-11
Attending: OBSTETRICS & GYNECOLOGY
Payer: MEDICAID

## 2023-05-11 ENCOUNTER — OFFICE VISIT (OUTPATIENT)
Dept: OBSTETRICS AND GYNECOLOGY | Facility: CLINIC | Age: 58
End: 2023-05-11
Payer: MEDICAID

## 2023-05-11 VITALS
BODY MASS INDEX: 57.52 KG/M2 | HEIGHT: 60 IN | DIASTOLIC BLOOD PRESSURE: 82 MMHG | WEIGHT: 293 LBS | SYSTOLIC BLOOD PRESSURE: 126 MMHG

## 2023-05-11 DIAGNOSIS — N83.291 COMPLEX CYST OF RIGHT OVARY: ICD-10-CM

## 2023-05-11 DIAGNOSIS — Z01.411 ENCOUNTER FOR WELL WOMAN EXAM WITH ABNORMAL FINDINGS: Primary | ICD-10-CM

## 2023-05-11 LAB — CEA SERPL-MCNC: 2.8 NG/ML (ref 0–3)

## 2023-05-11 PROCEDURE — 82378 CARCINOEMBRYONIC ANTIGEN: CPT

## 2023-05-11 PROCEDURE — 99396 PR PREVENTIVE VISIT,EST,40-64: ICD-10-PCS | Mod: ,,, | Performed by: OBSTETRICS & GYNECOLOGY

## 2023-05-11 PROCEDURE — 1159F MED LIST DOCD IN RCRD: CPT | Mod: CPTII,,, | Performed by: OBSTETRICS & GYNECOLOGY

## 2023-05-11 PROCEDURE — 3008F BODY MASS INDEX DOCD: CPT | Mod: CPTII,,, | Performed by: OBSTETRICS & GYNECOLOGY

## 2023-05-11 PROCEDURE — 3074F SYST BP LT 130 MM HG: CPT | Mod: CPTII,,, | Performed by: OBSTETRICS & GYNECOLOGY

## 2023-05-11 PROCEDURE — 3008F PR BODY MASS INDEX (BMI) DOCUMENTED: ICD-10-PCS | Mod: CPTII,,, | Performed by: OBSTETRICS & GYNECOLOGY

## 2023-05-11 PROCEDURE — 36415 COLL VENOUS BLD VENIPUNCTURE: CPT

## 2023-05-11 PROCEDURE — 3079F PR MOST RECENT DIASTOLIC BLOOD PRESSURE 80-89 MM HG: ICD-10-PCS | Mod: CPTII,,, | Performed by: OBSTETRICS & GYNECOLOGY

## 2023-05-11 PROCEDURE — 3074F PR MOST RECENT SYSTOLIC BLOOD PRESSURE < 130 MM HG: ICD-10-PCS | Mod: CPTII,,, | Performed by: OBSTETRICS & GYNECOLOGY

## 2023-05-11 PROCEDURE — 1159F PR MEDICATION LIST DOCUMENTED IN MEDICAL RECORD: ICD-10-PCS | Mod: CPTII,,, | Performed by: OBSTETRICS & GYNECOLOGY

## 2023-05-11 PROCEDURE — 99396 PREV VISIT EST AGE 40-64: CPT | Mod: ,,, | Performed by: OBSTETRICS & GYNECOLOGY

## 2023-05-11 PROCEDURE — 3079F DIAST BP 80-89 MM HG: CPT | Mod: CPTII,,, | Performed by: OBSTETRICS & GYNECOLOGY

## 2023-05-11 RX ORDER — EVOLOCUMAB 140 MG/ML
INJECTION, SOLUTION SUBCUTANEOUS
COMMUNITY
Start: 2023-04-12

## 2023-05-11 RX ORDER — ASPIRIN 81 MG/1
1 TABLET ORAL EVERY MORNING
COMMUNITY

## 2023-05-11 RX ORDER — VIT A/VIT C/VIT E/ZINC/COPPER 4296-226
CAPSULE ORAL
COMMUNITY

## 2023-05-11 NOTE — PROGRESS NOTES
Chief Complaint: Annual exam    Chief Complaint   Patient presents with    Well Woman       HPI:   Natalia Gomes is a 57 y.o. year old WF here for her Annual Exam and to follow up stable 8 cm right ov cyst.  Cyst has thin internal septation and has been stable in size since .    Pt was previously referred to Dr Alen Morris in early  following MRI results of 8cm adnexal cyst, has been followed since with serial U/S and  which have all been normal since.     :  23: 15.1   22: 9.32  21: 8.75    MMG WNL     US pelvis: 23:  FINDINGS:  Uterus: The patient is post hysterectomy.     Ovaries: The right ovary measures 8.1 x 6.7 x 7.5 cm in the left ovary measures 1.2 x 0.8 x 1 point 2 cm.  A large (approximately 8 cm), nontender cyst with a thin internal septation, no mural nodularity or internal echogenic debris or worrisome perfusion on color-flow mapping is noted originating from the right ovary.     Miscellaneous: There is no evidence of free fluid in the cul-de-sac and the patient was not tender while scanning over the pelvis and adnexal regions.     Impression:     1. The patient is post hysterectomy.  2. A large (at least 8 cm), nontender cyst containing a thin internal septation is noted originating from the right ovary.  See above comments.    GYN history:  Hysterectomy:  Yes  Last pap:unknown  H/o Abnormal Pap: Yes   Colposcopy: unknown  Postcoital Bleeding: n/a  Sexually Active: no   Dyspareunia: n/a  H/o STI: No   HRT: yes, premarin 0.45mg  MM2022  H/o abnl MMG: no  Colonoscopy: 2017    Per previous note:  21:  Chief Complaint   f/u pelvic u/s & labs History of Present Illness 55yo  VB with 0cm benign appearing pelvic cyst here today to f/u  and pelvic u/s. Cyst has been stable for ~ 1 yr    US pelvis:  - Uterus: s/p hyst  - ES: s/p hyst  - ROV: not clearly visualized, previously noted 9.8cm cyst on 9/10/2020 measures 8.5cm  - LOV: not clearly  visualized, previously noted 9.8cm cyst on 9/10/2020 measures 8.5cm  - Free fluid: none noted    21  : 8.75    per VB previous note 2020:  hief Complaint  f/u pelvic u/s and Ca-125  History of Present Illness  55-year-old  2 para 2 status post hysterectomy who was referred to me with an MRI showing an 8 cm simple adnexal cyst. The patient Ca1 25 was low at 8. It is now 6 months later and she is undergone a repeat ultrasound as well as a Ca1 25 the Ca1 25 is again 8 which is in the normal range and an ultrasound shows no interval change. The patient was initially presented with the option of removal versus observation as she has significant comorbidities. The patient and I both agreed on simply following this over time as long as it was not a change to the worse. She is asymptomatic from this mass        Past Medical History:   Diagnosis Date    Depression     Diabetes     GERD (gastroesophageal reflux disease)     Hyperlipidemia     Hypertension     Mood disorder     Pain in back     Pain in leg, unspecified     Rheumatoid arthritis     Sleep apnea      Past Surgical History:   Procedure Laterality Date    bladder tact      HEMORRHOID SURGERY      HYSTERECTOMY      polyps removed from colon         Current Outpatient Medications:     aspirin (ECOTRIN) 81 MG EC tablet, Take 1 capsule by mouth once daily., Disp: , Rfl:     carvediloL (COREG) 3.125 MG tablet, Take 3.125 mg by mouth 2 (two) times daily., Disp: , Rfl:     esomeprazole (NEXIUM) 40 MG capsule, Take 40 mg by mouth., Disp: , Rfl:     ezetimibe (ZETIA) 10 mg tablet, Take 10 mg by mouth., Disp: , Rfl:     furosemide (LASIX) 40 MG tablet, Take 40 mg by mouth., Disp: , Rfl:     gemfibroziL (LOPID) 600 MG tablet, Take 600 mg by mouth 2 (two) times daily., Disp: , Rfl:     glimepiride (AMARYL) 4 MG tablet, Take 4 mg by mouth 2 (two) times daily., Disp: , Rfl:     lamoTRIgine (LAMICTAL) 100 MG tablet, Take 100 mg by mouth 2 (two) times daily.,  Disp: , Rfl:     LINZESS 290 mcg Cap capsule, Take 290 mcg by mouth every morning., Disp: , Rfl:     lisinopriL (PRINIVIL,ZESTRIL) 2.5 MG tablet, Take 2.5 mg by mouth every evening., Disp: , Rfl:     metFORMIN (GLUCOPHAGE) 1000 MG tablet, SMARTSI Tablet(s) By Mouth Morning-Evening, Disp: , Rfl:     olopatadine (PATANOL) 0.1 % ophthalmic solution, Place 1 drop into both eyes 2 (two) times daily., Disp: , Rfl:     OZEMPIC 0.25 mg or 0.5 mg(2 mg/1.5 mL) pen injector, , Disp: , Rfl:     pioglitazone (ACTOS) 45 MG tablet, Take 45 mg by mouth., Disp: , Rfl:     PREMARIN 0.45 mg tablet, Take 0.45 mg by mouth., Disp: , Rfl:     REPATHA SURECLICK 140 mg/mL PnIj, INJECT 1 pen SUBCUTANEOUSLY once Every 2 weeks, Disp: , Rfl:     rosuvastatin (CRESTOR) 40 MG Tab, Take 40 mg by mouth., Disp: , Rfl:     sertraline (ZOLOFT) 100 MG tablet, Take 100 mg by mouth., Disp: , Rfl:     traMADoL (ULTRAM) 50 mg tablet, Take 50 mg by mouth., Disp: , Rfl:     vitamins A,C,E-zinc-copper (PRESERVISION AREDS) 4,296 mcg-226 mg-90 mg Cap, as directed Orally, Disp: , Rfl:   Review of patient's allergies indicates:  No Known Allergies  OB History   No obstetric history on file.     Social History     Tobacco Use    Smoking status: Never    Smokeless tobacco: Never   Substance Use Topics    Alcohol use: Not Currently    Drug use: Never     Family History   Problem Relation Age of Onset    Hypertension Father     Heart attack Father     Diabetes Mother     Cancer Brother        Review of Systems:   Review of Systems   Constitutional:  Negative for appetite change, chills, fatigue, fever and unexpected weight change.   Eyes:  Negative for visual disturbance.   Respiratory:  Negative for cough, shortness of breath and wheezing.    Cardiovascular:  Negative for chest pain, palpitations and leg swelling.   Gastrointestinal:  Negative for abdominal pain, bloating, blood in stool, constipation, diarrhea, nausea, vomiting, reflux and fecal incontinence.    Endocrine: Negative for hair loss and hot flashes.   Genitourinary:  Negative for bladder incontinence, decreased libido, dysmenorrhea, dyspareunia, dysuria, flank pain, frequency, genital sores, hematuria, hot flashes, menorrhagia, menstrual problem, pelvic pain, urgency, vaginal bleeding, vaginal discharge, vaginal pain, urinary incontinence, postcoital bleeding, postmenopausal bleeding, vaginal dryness and vaginal odor.   Integumentary:  Negative for rash, acne, hair changes, breast mass, nipple discharge, breast skin changes and breast tenderness.   Neurological:  Negative for headaches.   Psychiatric/Behavioral:  Negative for depression.    Breast: Negative for asymmetry, breast self exam, lump, mass, mastodynia, nipple discharge, skin changes and tenderness     Physical Exam:  /82 (BP Location: Left arm, Patient Position: Sitting)   Ht 5' (1.524 m)   Wt (!) 136.3 kg (300 lb 6.4 oz)   BMI 58.67 kg/m²       Physical Exam:   Constitutional: No distress.   Morbidly obese    HENT:   Head: Normocephalic and atraumatic.    Eyes: Pupils are equal, round, and reactive to light. EOM are normal.     Cardiovascular:       Exam reveals no clubbing and no cyanosis.        Pulmonary/Chest: Effort normal and breath sounds normal. No respiratory distress. Chest wall is not dull to percussion. She exhibits no mass, no tenderness, no bony tenderness, no laceration, no crepitus, no edema, no deformity, no swelling and no retraction. Right breast exhibits no inverted nipple, no mass, no nipple discharge, no skin change, no tenderness, presence, no bleeding, no swelling, no mastectomy, no augmentation and no lumpectomy. Left breast exhibits no inverted nipple, no mass, no nipple discharge, no skin change, no tenderness, presence, no bleeding, no swelling, no mastectomy, no augmentation and no lumpectomy. Breasts are symmetrical.        Abdominal: Soft. She exhibits no mass. There is no abdominal tenderness. There is no  rebound and no guarding.   Exam limited by body habitus                  Skin: She is not diaphoretic. No cyanosis. Nails show no clubbing.     Declines  pelvic exam     Assessment:   Annual Well Women Exam  1. Encounter for well woman exam with abnormal findings    2. Complex cyst of right ovary - stable from 2020  - CEA (in-house); Future  - CEA (in-house); Future  - ; Future  - US Pelvis Comp with Transvag NON-OB (xpd; Future        Plan:  Breast Self-awareness  Recommend annual mammogram  Recommend exercise at least 3 times weekly  Healthy, balanced diet  Keep yearly follow up with PCP  Follow up in about 6 months (around 11/11/2023) for f/u.       Reviewed recent US.  Cyst still stable in size and character   has trended up slightly in the last year.  Will repeat US and  q 6 months  Will also have her do CEA today    RTC 6 months for f/u

## 2023-05-16 ENCOUNTER — TELEPHONE (OUTPATIENT)
Dept: OBSTETRICS AND GYNECOLOGY | Facility: CLINIC | Age: 58
End: 2023-05-16
Payer: MEDICAID

## 2023-05-16 NOTE — TELEPHONE ENCOUNTER
----- Message from Zacarias Morris MD sent at 5/15/2023  3:57 PM CDT -----  Please notify her this is normal

## 2023-05-16 NOTE — TELEPHONE ENCOUNTER
Attempted to reach regarding CEA results, no answer. Voicemail not set up.    If returns call,please inform pt per Dr Adames, results are normal at this time.

## 2023-06-13 ENCOUNTER — HOSPITAL ENCOUNTER (OUTPATIENT)
Dept: RADIOLOGY | Facility: HOSPITAL | Age: 58
Discharge: HOME | End: 2023-06-13
Attending: FAMILY MEDICINE
Payer: MEDICAID

## 2023-06-13 DIAGNOSIS — R10.84 GENERALIZED ABDOMINAL PAIN: ICD-10-CM

## 2023-06-13 PROCEDURE — 74019 RADEX ABDOMEN 2 VIEWS: CPT | Mod: TC

## 2023-07-06 DIAGNOSIS — K92.1 MELENA: Primary | ICD-10-CM

## 2023-07-07 ENCOUNTER — CLINICAL SUPPORT (OUTPATIENT)
Dept: RESPIRATORY THERAPY | Facility: HOSPITAL | Age: 58
End: 2023-07-07
Attending: INTERNAL MEDICINE
Payer: MEDICAID

## 2023-07-07 DIAGNOSIS — K92.1 MELENA: ICD-10-CM

## 2023-07-07 PROCEDURE — 93005 ELECTROCARDIOGRAM TRACING: CPT

## 2023-07-10 RX ORDER — INSULIN GLARGINE 100 [IU]/ML
20 INJECTION, SOLUTION SUBCUTANEOUS EVERY MORNING
COMMUNITY

## 2023-07-10 NOTE — DISCHARGE INSTRUCTIONS
FOLLOW PREP WED 7-12-23---DO NOT EAT OR DRINK AFTER MIDNIGHT--TAKE CARVEDILOL AND LISINOPRIL AM OF PROCEDURE WITH SMALL SIP OF WATER--MUST HAVE  UPON DISCHARGE                                                                                         INSTRUCTIONS  AFTER A COLONOSCOPY/EGD                                                                                    NO DRIVING X 24 HOURS. NOTIFY YOUR DOCTOR WITH                                                                                 ABDOMINAL PAIN UNRELIEVED BY  PASSING GAS,                                                                           FEVER WITHIN 24 HOURS, OR LARGE AMOUNT OF BLEEDING.

## 2023-07-12 ENCOUNTER — ANESTHESIA EVENT (OUTPATIENT)
Dept: SURGERY | Facility: HOSPITAL | Age: 58
End: 2023-07-12
Payer: MEDICAID

## 2023-07-12 NOTE — ANESTHESIA PREPROCEDURE EVALUATION
07/12/2023  Natalia Gomes is a 58 y.o., female.      Pre-op Assessment    I have reviewed the Patient Summary Reports.     I have reviewed the Nursing Notes. I have reviewed the NPO Status.   I have reviewed the Medications.     Review of Systems  Anesthesia Hx:  Denies Family Hx of Anesthesia complications.   Denies Personal Hx of Anesthesia complications.   Social:  Non-Smoker, No Alcohol Use    Hematology/Oncology:  Hematology Normal   Oncology Normal     EENT/Dental:EENT/Dental Normal   Cardiovascular:   Hypertension, well controlled ECG has been reviewed.    Pulmonary:   Sleep Apnea    Renal/:  Renal/ Normal     Hepatic/GI:   GERD    Musculoskeletal:  Musculoskeletal Normal    Neurological:  Neurology Normal    Endocrine:   Diabetes, well controlled, type 2    Dermatological:  Skin Normal    Psych:   Psychiatric History          Physical Exam  General: Cooperative, Alert and Oriented    Airway:  Mallampati: II   Mouth Opening: Normal  TM Distance: Normal  Tongue: Normal  Neck ROM: Normal ROM    Dental:  Intact        Anesthesia Plan  Type of Anesthesia, risks & benefits discussed:    Anesthesia Type: Gen Natural Airway  Intra-op Monitoring Plan: Standard ASA Monitors  Post Op Pain Control Plan: multimodal analgesia  Induction:  IV  Informed Consent: Informed consent signed with the Patient and all parties understand the risks and agree with anesthesia plan.  All questions answered. Patient consented to blood products? Yes  ASA Score: 2    Ready For Surgery From Anesthesia Perspective.     .

## 2023-07-13 ENCOUNTER — ANESTHESIA (OUTPATIENT)
Dept: SURGERY | Facility: HOSPITAL | Age: 58
End: 2023-07-13
Payer: MEDICAID

## 2023-07-13 ENCOUNTER — HOSPITAL ENCOUNTER (OUTPATIENT)
Facility: HOSPITAL | Age: 58
Discharge: HOME | End: 2023-07-13
Attending: INTERNAL MEDICINE | Admitting: INTERNAL MEDICINE
Payer: MEDICAID

## 2023-07-13 VITALS
HEART RATE: 80 BPM | OXYGEN SATURATION: 94 % | WEIGHT: 293 LBS | DIASTOLIC BLOOD PRESSURE: 75 MMHG | RESPIRATION RATE: 20 BRPM | HEIGHT: 61 IN | BODY MASS INDEX: 55.32 KG/M2 | TEMPERATURE: 97 F | SYSTOLIC BLOOD PRESSURE: 142 MMHG

## 2023-07-13 DIAGNOSIS — K92.1 MELENA: ICD-10-CM

## 2023-07-13 LAB — POCT GLUCOSE: 235 MG/DL (ref 70–110)

## 2023-07-13 PROCEDURE — C1773 RET DEV, INSERTABLE: HCPCS | Performed by: INTERNAL MEDICINE

## 2023-07-13 PROCEDURE — 37000008 HC ANESTHESIA 1ST 15 MINUTES: Performed by: INTERNAL MEDICINE

## 2023-07-13 PROCEDURE — 27201423 OPTIME MED/SURG SUP & DEVICES STERILE SUPPLY: Performed by: INTERNAL MEDICINE

## 2023-07-13 PROCEDURE — 63600175 PHARM REV CODE 636 W HCPCS: Performed by: NURSE ANESTHETIST, CERTIFIED REGISTERED

## 2023-07-13 PROCEDURE — 63600175 PHARM REV CODE 636 W HCPCS: Performed by: ANESTHESIOLOGY

## 2023-07-13 PROCEDURE — 45385 COLONOSCOPY W/LESION REMOVAL: CPT | Performed by: INTERNAL MEDICINE

## 2023-07-13 PROCEDURE — 88313 SPECIAL STAINS GROUP 2: CPT

## 2023-07-13 PROCEDURE — 00813 ANES UPR LWR GI NDSC PX: CPT | Performed by: INTERNAL MEDICINE

## 2023-07-13 PROCEDURE — 25000003 PHARM REV CODE 250: Performed by: NURSE ANESTHETIST, CERTIFIED REGISTERED

## 2023-07-13 PROCEDURE — 88305 TISSUE EXAM BY PATHOLOGIST: CPT | Performed by: INTERNAL MEDICINE

## 2023-07-13 PROCEDURE — 43239 EGD BIOPSY SINGLE/MULTIPLE: CPT | Performed by: INTERNAL MEDICINE

## 2023-07-13 PROCEDURE — D9220A PRA ANESTHESIA: Mod: ,,, | Performed by: NURSE ANESTHETIST, CERTIFIED REGISTERED

## 2023-07-13 PROCEDURE — 82962 GLUCOSE BLOOD TEST: CPT | Performed by: INTERNAL MEDICINE

## 2023-07-13 PROCEDURE — D9220A PRA ANESTHESIA: ICD-10-PCS | Mod: ,,, | Performed by: NURSE ANESTHETIST, CERTIFIED REGISTERED

## 2023-07-13 PROCEDURE — 88312 SPECIAL STAINS GROUP 1: CPT

## 2023-07-13 PROCEDURE — 37000009 HC ANESTHESIA EA ADD 15 MINS: Performed by: INTERNAL MEDICINE

## 2023-07-13 RX ORDER — PROPOFOL 10 MG/ML
VIAL (ML) INTRAVENOUS
Status: DISCONTINUED | OUTPATIENT
Start: 2023-07-13 | End: 2023-07-13

## 2023-07-13 RX ORDER — FENTANYL CITRATE 50 UG/ML
INJECTION, SOLUTION INTRAMUSCULAR; INTRAVENOUS
Status: DISCONTINUED | OUTPATIENT
Start: 2023-07-13 | End: 2023-07-13

## 2023-07-13 RX ORDER — LIDOCAINE HYDROCHLORIDE 20 MG/ML
INJECTION INTRAVENOUS
Status: DISCONTINUED | OUTPATIENT
Start: 2023-07-13 | End: 2023-07-13

## 2023-07-13 RX ORDER — SODIUM CHLORIDE, SODIUM LACTATE, POTASSIUM CHLORIDE, CALCIUM CHLORIDE 600; 310; 30; 20 MG/100ML; MG/100ML; MG/100ML; MG/100ML
INJECTION, SOLUTION INTRAVENOUS CONTINUOUS
Status: DISCONTINUED | OUTPATIENT
Start: 2023-07-13 | End: 2023-07-13 | Stop reason: HOSPADM

## 2023-07-13 RX ADMIN — PROPOFOL 50 MG: 10 INJECTION, EMULSION INTRAVENOUS at 08:07

## 2023-07-13 RX ADMIN — FENTANYL CITRATE 100 MCG: 50 INJECTION, SOLUTION INTRAMUSCULAR; INTRAVENOUS at 07:07

## 2023-07-13 RX ADMIN — PROPOFOL 100 MG: 10 INJECTION, EMULSION INTRAVENOUS at 07:07

## 2023-07-13 RX ADMIN — PROPOFOL 50 MG: 10 INJECTION, EMULSION INTRAVENOUS at 07:07

## 2023-07-13 RX ADMIN — SODIUM CHLORIDE, POTASSIUM CHLORIDE, SODIUM LACTATE AND CALCIUM CHLORIDE: 600; 310; 30; 20 INJECTION, SOLUTION INTRAVENOUS at 06:07

## 2023-07-13 RX ADMIN — LIDOCAINE HYDROCHLORIDE 100 MG: 20 INJECTION, SOLUTION INTRAVENOUS at 07:07

## 2023-07-13 NOTE — OP NOTE
[facility] - Periop Services  Operative Note    Date of Procedure: 7/13/2023     Procedure: Procedure(s) (LRB):  COLONOSCOPY (N/A)  EGD (ESOPHAGOGASTRODUODENOSCOPY) (N/A)  EGD, WITH CLOSED BIOPSY (N/A)  POLYPECTOMY (N/A)   Colonoscopy with polypectomy cold  Colonoscopy with hot snare polypectomy    Surgeon(s) and Role:     * Aiden Jacinto III, MD - Primary    Assisting Surgeon: None    Pre-Operative Diagnosis: Melena [K92.1]  Melena    Post-Operative Diagnosis: Post-Op Diagnosis Codes:     * Melena [K92.1]  Ascending subcentimeter cold polypectomy   Distal transverse subcentimeter hot snare polypectomy  Internal hemorrhoids grade 1.          Endoscopic Specimens:  ID Type Source Tests Collected by Time Destination   A : antrum biopsy for h. pylori Tissue Antrum SPECIMEN TO PATHOLOGY Aiden Jacinto III, MD 7/13/2023 0748    B : ascending colon polyp Tissue Intestine Large, Ascending Colon SPECIMEN TO PATHOLOGY Aiden Jacinto III, MD 7/13/2023 0804    C : transverse colon polyp  Tissue Intestine Large, Transverse Colon SPECIMEN TO PATHOLOGY Aiden Jacinto III, MD 7/13/2023 0808          Anesthesia: General    Consent:  Patient was consented for the procedure at my office.  The risks and benefits of procedure explained in detail.  They were willing to undergo those risks.    HPI & Operative Findings (including complications, if any):  58-year-old white female patient with a colonoscopy by me in 2017 she had some polyps.  She had some melena that was side she noted on her last couple of weeks.  EGD was unrevealing above as of potential source.    Patient was brought down to endoscopy suite.  Laid in left lateral decubitus position after the EGD and was turned 180°.  Rectal exam showed some old thrombosed hemorrhoids but no other adverse pathology.      The scope was then lubricated advanced easily all with cecum sick was visualized and photographed.  Terminal ileum was visualized a could  intubated cause of the laxity but clearly no in no up polyps masses any mucosal changes no signs of stricturing.      I of 360 degree circumferential views were taken the entire colon.  The cecum was clear of any mass lesions or polyps.  There was a small 3 mm polyp that was in the proximal ascending colon.  This was removed piecemeal with cold biopsy forceps sent off a jar letter A.  Neetu per for no bleed.  The ascending colon was then normal thereafter.  Hepatic flexure normal.      Transverse colon had a distal 5 mm polyp that we removed with a hot snare sent off a jar letter DENISSE Abrams the polyp trap.  The splenic flexure descending colon sigmoid and were within normal limits no signs of diverticular disease.  The rectum on retroflexion I could not accomplish because of the colonic laxity she had.  But I removed the scope by antegrade retrograde several times she denied any abnormal pathology in the rectum.  She has some questionable internal hemorrhoids which I think was a source for bleed.      Discussion:     Will follow-up on her biopsies of her polyps.  Likely repeat colonoscopy in 7 years pending pathology.    Blood Loss (EBL): 0 mL           Implants: * No implants in log *    Specimens:   Specimen (24h ago, onward)       Start     Ordered    07/13/23 0748  Specimen to Pathology  RELEASE UPON ORDERING        References:    Click here for ordering Quick Tip   Question:  Release to patient  Answer:  Immediate    07/13/23 0748                            Condition: Stable for Discharge    Disposition: Home or Self Care        Discharge Note    OUTCOME: Patient tolerated treatment/procedure well without complication and is now ready for discharge.    DISPOSITION: Home or Self Care    FINAL DIAGNOSIS:  <principal problem not specified>    FOLLOWUP: Follow up in clinic in 1-2 weeks to review biopsies    DISCHARGE INSTRUCTIONS:  No discharge procedures on file.     Clinical Reference Documents Added to Patient  Instructions         Document    COLONOSCOPY DISCHARGE INSTRUCTIONS (ENGLISH)    UPPER GI ENDOSCOPY DISCHARGE INSTRUCTIONS (ENGLISH)

## 2023-07-13 NOTE — OP NOTE
[facility] - Periop Services  Operative Note    Date of Procedure: 7/13/2023     Procedure: Procedure(s) (LRB):  COLONOSCOPY (N/A)  EGD (ESOPHAGOGASTRODUODENOSCOPY) (N/A)  EGD, WITH CLOSED BIOPSY (N/A)  POLYPECTOMY (N/A)   EGD with cold biopsy    Surgeon(s) and Role:     * Aiden Jacinto III, MD - Primary    Assisting Surgeon: None    Pre-Operative Diagnosis: Melena [K92.1]  Melena  Abdominal bloating    Post-Operative Diagnosis: Post-Op Diagnosis Codes:     * Melena [K92.1]  Mild chronic gastritis  Status post H pylori biopsy gastric antrum    Endoscopic Specimens:  ID Type Source Tests Collected by Time Destination   A : antrum biopsy for h. pylori Tissue Antrum SPECIMEN TO PATHOLOGY Aiden Jacinto III, MD 7/13/2023 0748    B : ascending colon polyp Tissue Intestine Large, Ascending Colon SPECIMEN TO PATHOLOGY Aiden Jacinto III, MD 7/13/2023 0804    C : transverse colon polyp  Tissue Intestine Large, Transverse Colon SPECIMEN TO PATHOLOGY Aiden Jacinto III, MD 7/13/2023 0808          Anesthesia: General    Consent:  Patient was consented for the procedure at my office.  The risks and benefits of procedure explained in detail.  They were willing to undergo those risks.    HPI & Operative Findings (including complications, if any):  58-year-old white female with a history of abdominal pain bloating.  She had some melena and will perform an EGD to rule out source for upper GI symptoms.      She was brought down to the endoscopy suite.  Patel Daniel CRNA present.  And stated please see their documentation for medications administered.      She was kept in the supine position because she was morbidly obese.  Neck jaw thrust was able to help us while she the bite block after the gastric Olympus scope was lubricated and passed down the posterior oropharynx were upon there were no abnormalities.  The esophagus was also clear.  Stomach was then evaluated.  Showed mild chronic gastritis throughout.   The duodenum was clear all 4 portions.  Biopsies taken gastric antrum for H pylori sent off.      Greater lesser curvatures angularis normal.  Scope and retroflexion cardiac and fundic regions normal.  No signs of hiatal hernia.  Next para scope was taken back GE junction looked good.  No signs of active adverse pathology.  Scope was then pulled back good gastric and esophageal peristalsis.  Otherwise no abnormalities noted.  The scope was removed patient tolerated procedure well.      Discussion:     And will perform my a through GE reflux maneuvers with her.  Obviously because of her diet and recently on Ozempic.  I have concerns of decreased peristalsis as well as poor diet for the gastric epithelium/mucosa.  Will work on that with her and see we get her symptomatic relief for her.    Blood Loss (EBL): 0 mL           Implants: * No implants in log *    Specimens:   Specimen (24h ago, onward)       Start     Ordered    07/13/23 0748  Specimen to Pathology  RELEASE UPON ORDERING        References:    Click here for ordering Quick Tip   Question:  Release to patient  Answer:  Immediate    07/13/23 0748                            Condition: Stable for Discharge    Disposition: Home or Self Care        Discharge Note    OUTCOME: Patient tolerated treatment/procedure well without complication and is now ready for discharge.    DISPOSITION: Home or Self Care    FINAL DIAGNOSIS:  <principal problem not specified>    FOLLOWUP: Follow up in clinic in 1-2 weeks to review biopsies    DISCHARGE INSTRUCTIONS:  No discharge procedures on file.     Clinical Reference Documents Added to Patient Instructions         Document    COLONOSCOPY DISCHARGE INSTRUCTIONS (ENGLISH)    UPPER GI ENDOSCOPY DISCHARGE INSTRUCTIONS (ENGLISH)

## 2023-07-13 NOTE — ANESTHESIA POSTPROCEDURE EVALUATION
Anesthesia Post Evaluation    Patient: Natalia Gomes    Procedure(s) Performed: Procedure(s) (LRB):  COLONOSCOPY (N/A)  EGD (ESOPHAGOGASTRODUODENOSCOPY) (N/A)  EGD, WITH CLOSED BIOPSY (N/A)  POLYPECTOMY (N/A)    Final Anesthesia Type: general      Patient participation: Yes- Able to Participate  Level of consciousness: awake and alert  Post-procedure vital signs: reviewed and stable  Pain management: adequate  Airway patency: patent    PONV status at discharge: No PONV  Anesthetic complications: no      Cardiovascular status: blood pressure returned to baseline  Respiratory status: unassisted  Hydration status: euvolemic  Follow-up not needed.          Vitals Value Taken Time   /84 07/13/23 0637   Temp 36.6 °C (97.8 °F) 07/13/23 0637   Pulse 105 07/13/23 0637   Resp  07/13/23 0819   SpO2 94 % 07/13/23 0637         No case tracking events are documented in the log.      Pain/Lena Score: No data recorded

## 2023-07-14 LAB — PSYCHE PATHOLOGY RESULT: NORMAL

## 2023-12-04 ENCOUNTER — HOSPITAL ENCOUNTER (OUTPATIENT)
Dept: RADIOLOGY | Facility: HOSPITAL | Age: 58
Discharge: HOME | End: 2023-12-04
Attending: OBSTETRICS & GYNECOLOGY
Payer: MEDICAID

## 2023-12-04 DIAGNOSIS — N83.291 COMPLEX CYST OF RIGHT OVARY: ICD-10-CM

## 2023-12-04 PROCEDURE — 76830 TRANSVAGINAL US NON-OB: CPT | Mod: TC

## 2023-12-06 ENCOUNTER — TELEPHONE (OUTPATIENT)
Dept: OBSTETRICS AND GYNECOLOGY | Facility: CLINIC | Age: 58
End: 2023-12-06
Payer: MEDICAID

## 2023-12-06 DIAGNOSIS — R97.0 ELEVATED CEA: Primary | ICD-10-CM

## 2023-12-06 DIAGNOSIS — N83.291 COMPLEX CYST OF RIGHT OVARY: ICD-10-CM

## 2023-12-06 NOTE — TELEPHONE ENCOUNTER
----- Message from Zacarias Morris MD sent at 12/5/2023  1:55 PM CST -----  Cyst appears stable, but CEA is 3.09.   kaci: 13.    Refer to GYN ONC for management of cyst.

## 2024-01-06 NOTE — PROGRESS NOTES
[facility] General Surgery Clinic Note    HPI: Natalia Gomes is a 57 y.o. presenting with pain and tightness in the upper back due to prior lipoma excised from the area 3-4 years ago. Patient says the pain is intermittent with no precipitating factors or aggravating. She has tried cold packs and heat with no relief. Patients concern is that the tightness and pain prevent her from being able to bend over and notes that the most problematic symptom is that it prevents her from being able to wipe herself on the toilet. Patient denies any bleeding or drainage from the area. Patient denies any other associated symptoms.     PMH:   Past Medical History:   Diagnosis Date    Depression     Diabetes     GERD (gastroesophageal reflux disease)     Hyperlipidemia     Hypertension     Mood disorder     Pain in back     Pain in leg, unspecified     Rheumatoid arthritis     Sleep apnea       Meds:   Current Outpatient Medications:     carvediloL (COREG) 3.125 MG tablet, Take 3.125 mg by mouth 2 (two) times daily., Disp: , Rfl:     esomeprazole (NEXIUM) 40 MG capsule, Take 40 mg by mouth., Disp: , Rfl:     ezetimibe (ZETIA) 10 mg tablet, Take 10 mg by mouth., Disp: , Rfl:     furosemide (LASIX) 40 MG tablet, Take 40 mg by mouth., Disp: , Rfl:     gemfibroziL (LOPID) 600 MG tablet, Take 600 mg by mouth 2 (two) times daily., Disp: , Rfl:     glimepiride (AMARYL) 4 MG tablet, Take 4 mg by mouth 2 (two) times daily., Disp: , Rfl:     lamoTRIgine (LAMICTAL) 100 MG tablet, Take 100 mg by mouth 2 (two) times daily., Disp: , Rfl:     LINZESS 290 mcg Cap capsule, Take 290 mcg by mouth every morning., Disp: , Rfl:     lisinopriL (PRINIVIL,ZESTRIL) 2.5 MG tablet, Take 2.5 mg by mouth every evening., Disp: , Rfl:     metFORMIN (GLUCOPHAGE) 1000 MG tablet, SMARTSI Tablet(s) By Mouth Morning-Evening, Disp: , Rfl:     olopatadine (PATANOL) 0.1 % ophthalmic solution, Place 1 drop into both eyes 2 (two) times daily., Disp: , Rfl:     OZEMPIC 0.25  mg or 0.5 mg(2 mg/1.5 mL) pen injector, , Disp: , Rfl:     pioglitazone (ACTOS) 45 MG tablet, Take 45 mg by mouth., Disp: , Rfl:     PREMARIN 0.45 mg tablet, Take 0.45 mg by mouth., Disp: , Rfl:     rosuvastatin (CRESTOR) 40 MG Tab, Take 40 mg by mouth., Disp: , Rfl:     sertraline (ZOLOFT) 100 MG tablet, Take 100 mg by mouth., Disp: , Rfl:     traMADoL (ULTRAM) 50 mg tablet, Take 50 mg by mouth., Disp: , Rfl:   Allergies: Review of patient's allergies indicates:  No Known Allergies  Social History:   Social History     Tobacco Use    Smoking status: Never    Smokeless tobacco: Never     Family History:   Family History   Problem Relation Age of Onset    Hypertension Father     Heart attack Father     Diabetes Mother      Surgical History:   Past Surgical History:   Procedure Laterality Date    bladder tact      HEMORRHOID SURGERY      HYSTERECTOMY      polyps removed from colon       Review of Systems:  General: Not in any current pain, no fever or n/v, patient is obese   Cardio: No palpitation, or chest pain  Pulmonary: no wheezing, SOB at rest, no recent URI   GI: denies recent diarrhea  : No difficulty urinating, burning   MSK: pain in knee due to arthritis, no pain in upper extremities      Objective:    Vitals:  Vitals:    01/09/23 1329   BP: (!) 154/79   Pulse: 85   Resp: 18          Physical Exam:  Gen: NAD  Neuro: awake, alert, answering questions appropriately  CV: RRR  Resp: non-labored breathing  Ext: moves all 4 spontaneously and purposefully, pain in left knee when ambulating    Skin: warm, well perfused, well healed scar on examination of location of interest on upper back along the trapezius muscle, palpation does not elicit pain, palpation reveals rough and dense scar tissue under the previous incision site     Pertinent Labs:  N/A     Imaging:  N/A    Micro/Path/Other:  N/A    Assessment/Plan:  57 Year Old female presents with 3 year history surgical scar that is restricting movement and  preventing her from performing JOHN.     - Recommend massage therapy to disrupt scar tissue  - No acute surgical intervention    Gustabo Zambrano MS3 [facility]   01/09/2023 2:05 PM     58 yo F with s/p lipoma excision to her back 3 years prior. C/o restrictive symptoms of posterior neck but no draining wound or hypertrophic scarring. Denies systemic symptoms. Incisional site well healed on exam.    Agree with no surgical intervention  Agree with massage therapy  RTC PRN    Carmine Canas MD  [employer] General Surgery PGY3     You can access the FollowMyHealth Patient Portal offered by [facility] by registering at the following website: [email or web address]. By joining [employer]’s FollowMyHealth portal, you will also be able to view your health information using other applications (apps) compatible with our system. You can access the FollowMyHealth Patient Portal offered by [facility] by registering at the following website: [email or web address]. By joining [employer]’s FollowMyHealth portal, you will also be able to view your health information using other applications (apps) compatible with our system. You can access the FollowMyHealth Patient Portal offered by [facility] by registering at the following website: [email or web address]. By joining [email or web address]’s FollowMyHealth portal, you will also be able to view your health information using other applications (apps) compatible with our system.

## 2024-06-24 DIAGNOSIS — Z12.31 ENCOUNTER FOR SCREENING MAMMOGRAM FOR MALIGNANT NEOPLASM OF BREAST: Primary | ICD-10-CM

## 2024-07-02 ENCOUNTER — HOSPITAL ENCOUNTER (OUTPATIENT)
Dept: RADIOLOGY | Facility: HOSPITAL | Age: 59
Discharge: HOME | End: 2024-07-02
Attending: FAMILY MEDICINE
Payer: MEDICAID

## 2024-07-02 DIAGNOSIS — Z12.31 ENCOUNTER FOR SCREENING MAMMOGRAM FOR MALIGNANT NEOPLASM OF BREAST: ICD-10-CM

## 2024-07-02 PROCEDURE — 77067 SCR MAMMO BI INCL CAD: CPT | Mod: 26,,, | Performed by: STUDENT IN AN ORGANIZED HEALTH CARE EDUCATION/TRAINING PROGRAM

## 2024-07-02 PROCEDURE — 77063 BREAST TOMOSYNTHESIS BI: CPT | Mod: 26,,, | Performed by: STUDENT IN AN ORGANIZED HEALTH CARE EDUCATION/TRAINING PROGRAM

## 2024-07-02 PROCEDURE — 77067 SCR MAMMO BI INCL CAD: CPT | Mod: TC

## 2025-05-15 ENCOUNTER — HOSPITAL ENCOUNTER (OUTPATIENT)
Dept: RADIOLOGY | Facility: HOSPITAL | Age: 60
Discharge: HOME | End: 2025-05-15
Attending: FAMILY MEDICINE
Payer: MEDICAID

## 2025-05-15 DIAGNOSIS — M79.641 RIGHT HAND PAIN: ICD-10-CM

## 2025-05-15 DIAGNOSIS — M54.50 LOW BACK PAIN: ICD-10-CM

## 2025-05-15 PROCEDURE — 73130 X-RAY EXAM OF HAND: CPT | Mod: TC,RT

## 2025-05-15 PROCEDURE — 72100 X-RAY EXAM L-S SPINE 2/3 VWS: CPT | Mod: TC

## 2025-05-19 DIAGNOSIS — S63.094A: Primary | ICD-10-CM

## 2025-06-04 ENCOUNTER — OFFICE VISIT (OUTPATIENT)
Dept: ORTHOPEDICS | Facility: CLINIC | Age: 60
End: 2025-06-04
Payer: MEDICAID

## 2025-06-04 VITALS
SYSTOLIC BLOOD PRESSURE: 129 MMHG | HEART RATE: 97 BPM | DIASTOLIC BLOOD PRESSURE: 80 MMHG | OXYGEN SATURATION: 97 % | RESPIRATION RATE: 17 BRPM | HEIGHT: 61 IN | BODY MASS INDEX: 54.94 KG/M2 | WEIGHT: 291 LBS | TEMPERATURE: 98 F

## 2025-06-04 DIAGNOSIS — M18.11 ARTHRITIS OF CARPOMETACARPAL (CMC) JOINT OF RIGHT THUMB: Primary | ICD-10-CM

## 2025-06-04 PROCEDURE — 99215 OFFICE O/P EST HI 40 MIN: CPT | Mod: PBBFAC

## 2025-06-04 PROCEDURE — 20600 DRAIN/INJ JOINT/BURSA W/O US: CPT | Mod: PBBFAC | Performed by: STUDENT IN AN ORGANIZED HEALTH CARE EDUCATION/TRAINING PROGRAM

## 2025-06-04 RX ORDER — LIDOCAINE HYDROCHLORIDE 10 MG/ML
0.5 INJECTION, SOLUTION EPIDURAL; INFILTRATION; INTRACAUDAL; PERINEURAL
Status: COMPLETED | OUTPATIENT
Start: 2025-06-04 | End: 2025-06-04

## 2025-06-04 RX ORDER — TRIAMCINOLONE ACETONIDE 40 MG/ML
20 INJECTION, SUSPENSION INTRA-ARTICULAR; INTRAMUSCULAR
Status: COMPLETED | OUTPATIENT
Start: 2025-06-04 | End: 2025-06-04

## 2025-06-04 RX ADMIN — TRIAMCINOLONE ACETONIDE 20 MG: 40 INJECTION, SUSPENSION INTRA-ARTICULAR; INTRAMUSCULAR at 02:06

## 2025-06-04 RX ADMIN — LIDOCAINE HYDROCHLORIDE 5 MG: 10 INJECTION, SOLUTION EPIDURAL; INFILTRATION; INTRACAUDAL; PERINEURAL at 03:06

## 2025-08-07 ENCOUNTER — HOSPITAL ENCOUNTER (OUTPATIENT)
Dept: RADIOLOGY | Facility: HOSPITAL | Age: 60
Discharge: HOME | End: 2025-08-07
Payer: MEDICAID

## 2025-08-07 ENCOUNTER — OFFICE VISIT (OUTPATIENT)
Dept: ORTHOPEDICS | Facility: CLINIC | Age: 60
End: 2025-08-07
Payer: MEDICAID

## 2025-08-07 VITALS
BODY MASS INDEX: 51.61 KG/M2 | SYSTOLIC BLOOD PRESSURE: 143 MMHG | WEIGHT: 273.38 LBS | HEIGHT: 61 IN | HEART RATE: 100 BPM | DIASTOLIC BLOOD PRESSURE: 80 MMHG | OXYGEN SATURATION: 97 %

## 2025-08-07 DIAGNOSIS — M25.562 PAIN IN BOTH KNEES, UNSPECIFIED CHRONICITY: ICD-10-CM

## 2025-08-07 DIAGNOSIS — M25.561 PAIN IN BOTH KNEES, UNSPECIFIED CHRONICITY: ICD-10-CM

## 2025-08-07 DIAGNOSIS — M17.0 TRICOMPARTMENT OSTEOARTHRITIS OF KNEES, BILATERAL: Primary | ICD-10-CM

## 2025-08-07 PROCEDURE — 99214 OFFICE O/P EST MOD 30 MIN: CPT | Mod: PBBFAC,25

## 2025-08-07 PROCEDURE — 20610 DRAIN/INJ JOINT/BURSA W/O US: CPT | Mod: 50,PBBFAC

## 2025-08-07 PROCEDURE — 73564 X-RAY EXAM KNEE 4 OR MORE: CPT | Mod: TC,LT

## 2025-08-07 PROCEDURE — 73564 X-RAY EXAM KNEE 4 OR MORE: CPT | Mod: TC,RT

## 2025-08-07 RX ORDER — DICLOFENAC SODIUM 10 MG/G
2 GEL TOPICAL 4 TIMES DAILY
Qty: 100 G | Refills: 3 | Status: SHIPPED | OUTPATIENT
Start: 2025-08-07

## 2025-08-07 RX ORDER — METHYLPREDNISOLONE ACETATE 40 MG/ML
40 INJECTION, SUSPENSION INTRA-ARTICULAR; INTRALESIONAL; INTRAMUSCULAR; SOFT TISSUE
Status: COMPLETED | OUTPATIENT
Start: 2025-08-07 | End: 2025-08-07

## 2025-08-07 RX ORDER — LIDOCAINE HYDROCHLORIDE 10 MG/ML
5 INJECTION, SOLUTION EPIDURAL; INFILTRATION; INTRACAUDAL; PERINEURAL
Status: COMPLETED | OUTPATIENT
Start: 2025-08-07 | End: 2025-08-07

## 2025-08-07 RX ADMIN — LIDOCAINE HYDROCHLORIDE 50 MG: 10 INJECTION, SOLUTION EPIDURAL; INFILTRATION; INTRACAUDAL; PERINEURAL at 11:08

## 2025-08-07 RX ADMIN — METHYLPREDNISOLONE ACETATE 40 MG: 40 INJECTION, SUSPENSION INTRA-ARTICULAR; INTRALESIONAL; INTRAMUSCULAR; SOFT TISSUE at 11:08

## 2025-08-07 NOTE — PROGRESS NOTES
" Sports Medicine Clinic   [facility]                                                   SUBJECTIVE     Patient: Natalia Gomes is a 60 y.o. female.   Patient Type: New patient to [facility] Medicine Clinic   Insurance Carrier: Medicaid    Chief Complaint: Left/Right/Bilateral: Bilateral (R = L) knee pain - global stiffness, aching, sharp, and dull .         HPI:  Presents to the clinic for bilateral knee pain has been increasing over the last 6 months but she has been dealing with for many years now.  More recently she has also been experiencing sensation of her kneecap locking on her when she ambulates.  She states that she was previously receiving Visco intra-articular injections about 5 years ago with great relief.  He has been doing at home exercises and has been using over-the-counter topical medications as well as oral medications prescribed by her PCP without any relief.  She rates her pain a 6/10 bilaterally currently.    Injury/ Surgical Hx r/t CC:   no Hx of prior significant joint injury   Onset:  several years   constant    Modifying Factors:  Exacerbated by:  increased activity, prolonged walking/ standing, climbing stairs  Improved with:  rest   Associated Symptoms:  [x] decreased ROM  [x] swelling   [x] weakness  [] numbness/tingling  [] difficult sleeping s/t pain [x] crepitus  [] pseudo-instability  [] instability giving-way   [] falls   Activity:  sedentary with light activity   Previous Treatments:  [x] HEP > 6 weeks   [x] OTC Pain Relievers: Tylenol  [x] Rx Medications: Tramadol  [x] Last intra-articular CSI/VSI injection over 5 years ago   Co-Morbid Conditions:  Comorbidities: HTN, DM , GERD, and Obesity      REVIEW OF SYSTEMS:  (+)    [] Fevers/Chills   [] Unexplained weight loss   [] Excessive fatigue   [] Night sweats     OBJECTIVE     Body mass index is 51.65 kg/m².   BP (!) 143/80   Pulse 100   Ht 5' 1" (1.549 m)   Wt 124 kg (273 lb 5.9 oz)   SpO2 97%   BMI " 51.65 kg/m²     General:  no apparent distress, no pain indicators,   Neuro/ Psych: Awake, alert, oriented, normal mood and affect  Neurovascular: Intact to light touch, no edema, peripheral pulse present    MSK Knee Exam:    INSPECTION  antalgic gait w/ full weight bearing   LEFT KNEE RIGHT KNEE   [] Effusion   [] Varus deformity  [] Valgus deformity  [x] Quad atrophy [] Rash  [] Contusion  [] Mass  [] Scars [] Effusion   [] Varus deformity  [] Valgus deformity  [x] Quad atrophy [] Rash  [] Contusion  [] Mass  [] Scars     PALPATION  LEFT KNEE RIGHT KNEE   Joint Warmth: normal  Joint Line Tenderness with Palpation: medial and lateral  Tenderness: None  [x] Patellar grind with compression  [x] Crepitus  [] Active mechanical locking with joint movement  [] Popliteal fullness  [] Tenderness of tibial plateau with palpation Joint Warmth: normal  Joint Line Tenderness with Palpation: medial and lateral  Tenderness: None  [x] Patellar grind with compression  [x] Crepitus  [] Active mechanical locking with joint movement  [] Popliteal fullness  [] Tenderness of tibial plateau with palpation     ROM  Active Flexion / Extension (0-140)  Left 0 - 120 w/ pain Right 0 - 120 w/ pain       STRENGTH  Flexion / Extension    Pain (+)   Pain  (+)   Left Flexion  5/5 [x]  Right Flexion  5/5 [x]   Left Extension  5/5 [x]  Right Extension  5/5 [x]     SPECIAL TESTING  Not  Tested Joint Effusion L(+) L(-)  R(+) R(-)   [] Fluid Wave [] [x]  [] [x]   [] Ballotable Effusion [] [x]  [] [x]             Ligament Injury L(+) L(-)  R(+) R(-)   [] ACL Anterior Drawer [] [x]  [] [x]   [] PCL Posterior Drawer [] [x]  [] [x]   [] LCL Varus Test [] [x]  [] [x]   [] MCL Valgus Test [] [x]  [] [x]             Meniscal Injury L(+) L(-)  R(+) R(-)   [x] Malik's Test [] []  [] []   [x] Thessaly's Test [] []  [] []             Patellar Testing L(+) L(-)  R(+) R(-)   [] Apprehension [] [x]  [] [x]             IT Band Syndrome L(+) L(-)  R(+) R(-)   [x]  Bessy's Test [] []  [] []     Hip Exam: normal  Ankle Exam: normal    DATA REVIEW     LABS:   Last A1C:  7.2%    IMAGING:   X-rays ordered and performed today: yes  # of views: 4 Laterality: bilateral  My Interpretation: no fracture, dislocation, or swelling noted, bilateral medial > lateral joint space narrowing with osteophyte formation and subchondral sclerosis, calcific tendinosis of the quad tendon at the superior pole of the patella, KL grade 4    ASSESSMENT     Encounter Diagnoses   Code Name Primary?    M17.0 Tricompartment osteoarthritis of knees, bilateral Yes       PLAN     MDM: Prior external referring provider notes reviewed. Prior external referring provider studies reviewed.  DIAGNOSIS: Bilateral tricompartment Knee Osteoarthritis -  New problem  Treatment plan:    Patient educated on surgical versus non-surgical options.  Patient verbalized understanding, agrees to plan and denies further questions.   Ongoing education about Dx, treatment recommendations and reasonable expectations including goal to decrease pain and increase function.  Discussed with patient diagnosis and treatment recommendations. Recommend conservative treatment to include: avoidance of aggravating activity, significant modification of daily activities, hot/cold therapies, topical and oral medications, braces, HEP/PT/OT, and injections.  Patient aware condition has no cure and treatment plan is focused on management of symptoms.  Discussed conservative treatment options today, we will proceed with bilateral intra-articular CSI.  See procedure note below.  Immediately post injection pain reduced to 0/10.  We will have the patient start formal PT as well, need for script given to patient during visit today.  Also discussed VSI in the future as she has had great response to VSI in the past, we will start the PA in preparation for her next visit in 4 months  Weight management is paramount. Immediate BMI reduction goal 5-10% of body  weight.  Achieving a BMI < 25 would be optimal for overall health and symptoms relief. The patient has a BMI of Body mass index is 51.65 kg/m²., which is associated with an increased risk of surgical complications, including issues with anesthesia, wound healing, and post-operative recovery. Given these concerns, I have advised that the patient focus on weight loss in order to achieve a more medically optimized weight for surgery. Excess weight can adversely affect surgical outcomes and recovery. I recommend consulting with PCP to develop a weight loss plan that may include: surgery, medications, referral the patient to appropriate specialists, such as a dietitian or weight loss clinic.  She was recently started on Mounjaro by her PCP. Surgical options will be considered once the patient reaches a healthier weight of a BMI.    Activity: Activity as tolerated; HEP to include aerobic conditioning and strength training with non-painful activity. ROM/STG exercises. Proper footware; assistive devises to avoid limping.   Therapy: Physical Therapy (PT) and Home Exercise Program (HEP)  Exercise Prescription: daily HEP with TheraBand, proper supportive footwear, non-impact muscle strengthening with use of stationary bike (RPM set at 80 or > with slow progression to goal of 40 minutes 3-4 times per week as tolerated)  Procedure: CSI v. VS injections discussed, s/t failed conservative treatments patient consents to CSI today  RX Medications: START over-the-counter acetaminophen (Tylenol 1000 mg three times per day as needed)  START Voltaren Gel 1% as prescribed to affected area Please see your primary care physician for further refills.  RTC: 4 months    This note is dictated using the M*Modal Fluency Direct word recognition program. There are word recognition mistakes that are occasionally missed on review.     Bridgette Calvo MD  Sports Medicine         PROCEDURE NOTE     Large Joint Aspiration/Injection: bilateral  knee    Date/Time: 8/7/2025 10:50 AM    Performed by: Bridgette Calvo MD  Authorized by: Bridgette Calvo MD    Timeout: prior to procedure the correct patient, procedure, and site was verified    Prep: patient was prepped and draped in usual sterile fashion      Local anesthesia used?: Yes    Local anesthetic:  Topical anesthetic    Details:  Needle Size:  21 G  Ultrasonic Guidance for needle placement?: No    Approach:  Anterolateral  Location:  Knee  Laterality:  Bilateral  Site:  Bilateral knee  Patient tolerance:  Patient tolerated the procedure well with no immediate complications    Risks:  Possible complications with the injection include bleeding, infection (.01%), tendon rupture, steroid flare, fat pad or soft tissue atrophy, skin depigmentation, allergic reaction to medications and vasovagal response. (steroid flare treatment is rest, ice, NSAIDs and resolves in 24-36 hours.)    Consent:  No absolute contraindications (cellulitis overlying joint, infection, lack of informed consent, allergy to injection medication, AVN protein or egg allergy for sodium hyaluronate, or history of steroid flare) or relative contraindications (uncontrolled DM2 A1c>10, coagulopathy, INR > 3.5, previous joint replacement or history of AVN).        Description:  The patient was prepped in normal sterile fashion use of chlorhexidine scrub and the appropriate and anatomic landmarks were identified WITHOUT image guidance. Care was taken to ensure there was unrestricted flow of syringe contents (listed below) into the site of injection.        Body mass index is 51.65 kg/m².    Contents of syringe included: 5mL of 1% lidocaine with 40mg of Depo-Medrol (1mL of 40mg/mL)    Post Procedure: Patient alert, and moving all extremities. ROM improved, pain decreased.  Good peripheral pulses, no signs of vascular compromise and range of motion intact.  Aftercare instructions were given to patient at time of discharge.  Relative rest for 3  days-avoiding excess activity.  Place ice on the area for 15 minutes every 4-6 hours. Patient may take Tylenol a 1000 mg b.i.d. or ibuprofen 600 mg t.i.d. for the next 3-4 days if not on medication already and safe to take pending co-morbidities.  Protect the area for the next 1-8 hours if anesthetic was used.  Avoid excessive activity for the next 3-4 weeks.  ER precautions given for fever, severe joint pain or allergic reaction or other new symptoms related to the joint injection.

## (undated) DEVICE — FORCEP CAPTURA PRO SPK 230CM

## (undated) DEVICE — SNARE POLYP STD SNG 7FR

## (undated) DEVICE — PAD ELECTROSURGICAL SPL W/CORD

## (undated) DEVICE — BITE BLOCK ADULT LATEX FREE

## (undated) DEVICE — TRAP SUCTION POLYP

## (undated) DEVICE — KIT SURGICAL COLON .25 1.1OZ